# Patient Record
Sex: FEMALE | Race: BLACK OR AFRICAN AMERICAN | NOT HISPANIC OR LATINO | Employment: OTHER | URBAN - METROPOLITAN AREA
[De-identification: names, ages, dates, MRNs, and addresses within clinical notes are randomized per-mention and may not be internally consistent; named-entity substitution may affect disease eponyms.]

---

## 2017-02-14 ENCOUNTER — HOSPITAL ENCOUNTER (OUTPATIENT)
Dept: MAMMOGRAPHY | Facility: HOSPITAL | Age: 67
Discharge: HOME | End: 2017-02-14
Attending: FAMILY MEDICINE | Admitting: FAMILY MEDICINE

## 2017-02-24 ENCOUNTER — HOSPITAL ENCOUNTER (OUTPATIENT)
Dept: PAIN MEDICINE | Facility: HOSPITAL | Age: 67
Discharge: HOME | End: 2017-02-24
Attending: ANESTHESIOLOGY | Admitting: ANESTHESIOLOGY

## 2017-07-26 ENCOUNTER — HOSPITAL ENCOUNTER (OUTPATIENT)
Dept: PAIN MEDICINE | Facility: HOSPITAL | Age: 67
Discharge: HOME | End: 2017-07-26
Attending: ANESTHESIOLOGY | Admitting: ANESTHESIOLOGY

## 2018-02-20 ENCOUNTER — HOSPITAL ENCOUNTER (OUTPATIENT)
Dept: OTHER | Facility: HOSPITAL | Age: 68
Setting detail: SPECIMEN
Discharge: HOME | End: 2018-02-20
Attending: INTERNAL MEDICINE | Admitting: INTERNAL MEDICINE

## 2018-08-22 ENCOUNTER — HOSPITAL ENCOUNTER (OUTPATIENT)
Dept: PAIN MEDICINE | Facility: HOSPITAL | Age: 68
Discharge: HOME | End: 2018-08-22
Attending: ANESTHESIOLOGY | Admitting: ANESTHESIOLOGY

## 2018-10-24 ENCOUNTER — HOSPITAL ENCOUNTER (OUTPATIENT)
Dept: PAIN MEDICINE | Facility: HOSPITAL | Age: 68
Discharge: HOME | End: 2018-10-24
Attending: ANESTHESIOLOGY | Admitting: ANESTHESIOLOGY

## 2018-11-21 ENCOUNTER — HOSPITAL ENCOUNTER (OUTPATIENT)
Dept: PAIN MEDICINE | Facility: HOSPITAL | Age: 68
Discharge: HOME | End: 2018-11-21
Attending: ANESTHESIOLOGY | Admitting: ANESTHESIOLOGY

## 2018-12-28 ENCOUNTER — HOSPITAL ENCOUNTER (OUTPATIENT)
Dept: PAIN MEDICINE | Facility: HOSPITAL | Age: 68
Discharge: HOME | End: 2018-12-28
Attending: ANESTHESIOLOGY | Admitting: ANESTHESIOLOGY

## 2019-06-19 ENCOUNTER — TELEPHONE (OUTPATIENT)
Dept: NEUROLOGY | Facility: CLINIC | Age: 69
End: 2019-06-19

## 2019-06-25 ENCOUNTER — TELEPHONE (OUTPATIENT)
Dept: NEUROLOGY | Facility: CLINIC | Age: 69
End: 2019-06-25

## 2019-06-25 RX ORDER — LEVETIRACETAM 500 MG/1
500 TABLET ORAL 2 TIMES DAILY
Qty: 60 TABLET | Refills: 12 | Status: SHIPPED | OUTPATIENT
Start: 2019-06-25 | End: 2019-06-25 | Stop reason: SDUPTHER

## 2019-06-25 RX ORDER — TRAZODONE HYDROCHLORIDE 50 MG/1
TABLET ORAL
Refills: 1 | COMMUNITY
Start: 2019-05-01 | End: 2019-07-22 | Stop reason: SDUPTHER

## 2019-06-25 RX ORDER — LEVETIRACETAM 500 MG/1
TABLET ORAL
Qty: 180 TABLET | Refills: 3 | Status: SHIPPED | OUTPATIENT
Start: 2019-06-25 | End: 2019-11-04 | Stop reason: SDUPTHER

## 2019-07-22 RX ORDER — TRAZODONE HYDROCHLORIDE 50 MG/1
TABLET ORAL
Qty: 90 TABLET | Refills: 0 | Status: SHIPPED | OUTPATIENT
Start: 2019-07-22 | End: 2019-10-15 | Stop reason: SDUPTHER

## 2019-09-03 RX ORDER — GABAPENTIN 400 MG/1
CAPSULE ORAL
Qty: 270 CAPSULE | Refills: 1 | Status: SHIPPED | OUTPATIENT
Start: 2019-09-03 | End: 2019-11-04 | Stop reason: SDUPTHER

## 2019-10-15 RX ORDER — TRAZODONE HYDROCHLORIDE 50 MG/1
TABLET ORAL
Qty: 90 TABLET | Refills: 0 | Status: SHIPPED | OUTPATIENT
Start: 2019-10-15 | End: 2020-01-06

## 2019-10-17 ENCOUNTER — APPOINTMENT (OUTPATIENT)
Dept: PAIN MEDICINE | Facility: CLINIC | Age: 69
End: 2019-10-17

## 2019-11-04 ENCOUNTER — OFFICE VISIT (OUTPATIENT)
Dept: NEUROLOGY | Facility: CLINIC | Age: 69
End: 2019-11-04

## 2019-11-04 VITALS
HEART RATE: 64 BPM | HEIGHT: 60 IN | DIASTOLIC BLOOD PRESSURE: 89 MMHG | SYSTOLIC BLOOD PRESSURE: 174 MMHG | BODY MASS INDEX: 50.22 KG/M2 | WEIGHT: 255.8 LBS

## 2019-11-04 DIAGNOSIS — G47.33 OBSTRUCTIVE SLEEP APNEA SYNDROME: Primary | ICD-10-CM

## 2019-11-04 PROBLEM — R56.9 SEIZURES: Status: ACTIVE | Noted: 2019-11-04

## 2019-11-04 PROBLEM — G43.909 MIGRAINE: Status: ACTIVE | Noted: 2019-11-04

## 2019-11-04 PROBLEM — G47.30 SLEEP APNEA: Status: ACTIVE | Noted: 2019-11-04

## 2019-11-04 PROCEDURE — 99214 OFFICE O/P EST MOD 30 MIN: CPT | Performed by: PSYCHIATRY & NEUROLOGY

## 2019-11-04 RX ORDER — POTASSIUM CHLORIDE 20 MEQ/1
20 TABLET, EXTENDED RELEASE ORAL 3 TIMES DAILY
COMMUNITY
Start: 2019-09-19

## 2019-11-04 RX ORDER — MONTELUKAST SODIUM 10 MG/1
10 TABLET ORAL NIGHTLY
Refills: 3 | COMMUNITY
Start: 2019-09-04

## 2019-11-04 RX ORDER — OMEPRAZOLE 40 MG/1
CAPSULE, DELAYED RELEASE ORAL
Refills: 4 | COMMUNITY
Start: 2019-10-18

## 2019-11-04 RX ORDER — GABAPENTIN 400 MG/1
400 CAPSULE ORAL 3 TIMES DAILY
Qty: 270 CAPSULE | Refills: 1 | Status: SHIPPED | OUTPATIENT
Start: 2019-11-04 | End: 2020-07-13

## 2019-11-04 RX ORDER — LEVETIRACETAM 500 MG/1
500 TABLET ORAL 2 TIMES DAILY
Qty: 180 TABLET | Refills: 3 | Status: SHIPPED | OUTPATIENT
Start: 2019-11-04 | End: 2020-05-26 | Stop reason: SDUPTHER

## 2019-11-04 RX ORDER — PAROXETINE HYDROCHLORIDE 20 MG/1
20 TABLET, FILM COATED ORAL EVERY MORNING
COMMUNITY
Start: 2019-09-24

## 2019-11-04 NOTE — PROGRESS NOTES
Sleep medicine follow-up visit    Stefania Marie   1950  69 y.o. female   DATE OF SERVICE: 11/4/2019   Chief complaint, kenzie on  with cpap  Pt. f/u with cpap compliance, pt. doing well with pap therapy and uses nasal pillows and goes through [facility] for supplies.     MIGRAINE W/O AURA, unchanged doing well with Topamax. Improved patient one every now and then not as frequent.     Morbid obesity, unchanged, BMI 50    SLEEP TESTING HISTORY:    On NPSG at [facility] , 03/18/2014 patient had Severe obstructive sleep apnea syndrome with apnea-hypopnea index of 41.9 per sleep hour, minimum SpO2 of 79%    The compliance data reviewed and the patient is on CPAP therapy at 10-15 cm/H2O and compliance data indicates excellent compliance with 67% usage for more than 4 hours with an average usage of 6 hours 8 minutes. AHI down to 3.3 with CPAP therapy and mean CPAP pressure 12.1 cm of water.  The patient's hypersomnia also resolved with [place] Sleepiness Scale score of 0 with CPAP therapy.  The patient feels great and is benefiting from it and is compliant.     Complex partial seizures, doing well Topamax, gabapentin and keppra. Patient still has small spells comes on randomly only has had one couple of months ago.  Had one spell took 15 min to get back to baseline.     Review of Systems   Constitutional: Positive for fatigue. Negative for appetite change.   HENT: Negative for sinus pressure and sinus pain.    Eyes: Negative for pain and itching.   Respiratory: Negative for cough and shortness of breath.    Cardiovascular: Negative for chest pain and palpitations.   Gastrointestinal: Negative for constipation and diarrhea.   Endocrine: Negative for cold intolerance and heat intolerance.   Genitourinary: Positive for frequency. Negative for difficulty urinating.   Musculoskeletal: Positive for back pain. Negative for neck pain.   Allergic/Immunologic: Negative for environmental allergies.   Neurological: Positive for dizziness  and headaches. Negative for tremors, seizures, syncope, facial asymmetry, speech difficulty, weakness, light-headedness and numbness.   Psychiatric/Behavioral: Negative for agitation and confusion.     I reviewed and addressed ROS entered by MA.        The following portions of the patient's history were reviewed and updated as appropriate: allergies, current medications, past family history, past medical history, past social history, past surgical history and problem list.      History reviewed. No pertinent family history.    Past Medical History:   Diagnosis Date   • Anxiety    • Environmental and seasonal allergies    • GERD (gastroesophageal reflux disease)    • Migraine    • Seizures (CMS/HCC)    • Sleep apnea        Social History     Socioeconomic History   • Marital status:      Spouse name: Not on file   • Number of children: Not on file   • Years of education: Not on file   • Highest education level: Not on file   Tobacco Use   • Smoking status: Current Every Day Smoker   • Smokeless tobacco: Never Used   Substance and Sexual Activity   • Alcohol use: No     Frequency: Never   • Drug use: No   • Sexual activity: Defer         Current Outpatient Medications:   •  aspirin (ADULT ASPIRIN EC LOW STRENGTH) 81 MG EC tablet, ADULT ASPIRIN EC LOW STRENGTH 81 MG ORAL TABLET DELAYED RELEASE, Disp: , Rfl:   •  Calcium Carb-Cholecalciferol 1000-800 MG-UNIT tablet, CALTRATE 600+D3 TABS, Disp: , Rfl:   •  gabapentin (NEURONTIN) 400 MG capsule, TAKE 1 CAPSULE BY MOUTH THREE TIMES DAILY, Disp: 270 capsule, Rfl: 1  •  levETIRAcetam (KEPPRA) 500 MG tablet, TAKE 1 TABLET BY MOUTH TWICE DAILY, Disp: 180 tablet, Rfl: 3  •  montelukast (SINGULAIR) 10 MG tablet, , Disp: , Rfl: 3  •  omeprazole (priLOSEC) 40 MG capsule, TK ONE C PO  ONCE D, Disp: , Rfl: 4  •  PARoxetine (PAXIL) 20 MG tablet, , Disp: , Rfl:   •  potassium chloride (K-DUR,KLOR-CON) 20 MEQ CR tablet, , Disp: , Rfl:   •  topiramate (TOPAMAX) 200 MG tablet,  TAKE 1 TABLET BY MOUTH TWICE DAILY, Disp: 180 tablet, Rfl: 0  •  traZODone (DESYREL) 50 MG tablet, TAKE 1 TABLET BY MOUTH DAILY AT BEDTIME, Disp: 90 tablet, Rfl: 0    No Known Allergies     PHYSICAL EXAMINATION:  Vitals:    11/04/19 1651   BP: 174/89   Pulse: 64      Body mass index is 49.96 kg/m².       HEENT: Normal.      EXTREMITIES: No edema.     IMPRESSION:     Patient with obstructive sleep apnea syndrome with hypersomnia successfully treated with CPAP therapy and is compliant and benefiting from it.     Migraine, stable no ha in several months.    Seizures, pt continues to have occ PCS, on review of her med list apparently she has not been taking keppra, so this was reordered    Obesity     RECOMMENDATIONS:   1. Continue cpap 10-15   2. Pt encouraged to lose weight  3. Continue keppra, topamax and gabapentin    EPWORTH SLEEPINESS SCALE  Sitting and reading  0  WatchingTV  0  Sitting, inactive, in a public place  0  As a passenger in a car for 1 hour w/o a break  0  Lying down to rest in the afternoon  0  Sitting and talking to someone  0  Sitting quietly after a lunch  0  In a car, while stopped for traffic or a light  0  Total 0        This document has been electronically signed by Joseph Seipel, MD on November 4, 2019 5:14 PM

## 2019-11-13 ENCOUNTER — TELEPHONE (OUTPATIENT)
Dept: NEUROLOGY | Facility: CLINIC | Age: 69
End: 2019-11-13

## 2019-11-13 DIAGNOSIS — G47.33 OBSTRUCTIVE SLEEP APNEA: Primary | ICD-10-CM

## 2020-01-06 RX ORDER — TRAZODONE HYDROCHLORIDE 50 MG/1
TABLET ORAL
Qty: 90 TABLET | Refills: 1 | Status: SHIPPED | OUTPATIENT
Start: 2020-01-06 | End: 2020-04-13

## 2020-02-03 ENCOUNTER — APPOINTMENT (OUTPATIENT)
Dept: CT IMAGING | Facility: HOSPITAL | Age: 70
End: 2020-02-03

## 2020-02-03 ENCOUNTER — APPOINTMENT (OUTPATIENT)
Dept: GENERAL RADIOLOGY | Facility: HOSPITAL | Age: 70
End: 2020-02-03

## 2020-02-03 ENCOUNTER — HOSPITAL ENCOUNTER (OUTPATIENT)
Facility: HOSPITAL | Age: 70
Setting detail: OBSERVATION
Discharge: HOME | End: 2020-02-05
Attending: FAMILY MEDICINE | Admitting: FAMILY MEDICINE

## 2020-02-03 DIAGNOSIS — R77.8 ELEVATED TROPONIN: ICD-10-CM

## 2020-02-03 DIAGNOSIS — R07.9 CHEST PAIN, UNSPECIFIED TYPE: Primary | ICD-10-CM

## 2020-02-03 DIAGNOSIS — R06.00 DYSPNEA, UNSPECIFIED TYPE: ICD-10-CM

## 2020-02-03 PROBLEM — R06.02 SHORTNESS OF BREATH: Status: ACTIVE | Noted: 2020-02-03

## 2020-02-03 PROBLEM — G40.209 COMPLEX PARTIAL SEIZURE (HCC): Chronic | Status: ACTIVE | Noted: 2019-11-04

## 2020-02-03 PROBLEM — M19.91 PRIMARY OSTEOARTHRITIS: Chronic | Status: ACTIVE | Noted: 2020-02-03

## 2020-02-03 PROBLEM — K21.9 GERD (GASTROESOPHAGEAL REFLUX DISEASE): Chronic | Status: ACTIVE | Noted: 2020-02-03

## 2020-02-03 PROBLEM — F17.200 TOBACCO DEPENDENCY: Chronic | Status: ACTIVE | Noted: 2020-02-03

## 2020-02-03 PROBLEM — R79.89 ELEVATED TROPONIN: Status: ACTIVE | Noted: 2020-02-03

## 2020-02-03 PROBLEM — D64.9 NORMOCYTIC ANEMIA: Status: ACTIVE | Noted: 2020-02-03

## 2020-02-03 PROBLEM — G47.30 SLEEP APNEA: Chronic | Status: ACTIVE | Noted: 2019-11-04

## 2020-02-03 PROBLEM — M19.91 PRIMARY OSTEOARTHRITIS: Status: ACTIVE | Noted: 2020-02-03

## 2020-02-03 PROBLEM — G43.909 MIGRAINE: Chronic | Status: ACTIVE | Noted: 2019-11-04

## 2020-02-03 PROBLEM — R68.84 PAIN IN LOWER JAW: Status: ACTIVE | Noted: 2020-02-03

## 2020-02-03 LAB
ANION GAP SERPL CALCULATED.3IONS-SCNC: 14 MMOL/L (ref 5–15)
APTT PPP: 23.3 SECONDS (ref 24–31)
BASOPHILS # BLD AUTO: 0 10*3/MM3 (ref 0–0.2)
BASOPHILS NFR BLD AUTO: 0.5 % (ref 0–1.5)
BUN BLD-MCNC: 22 MG/DL (ref 8–23)
BUN/CREAT SERPL: 27.2 (ref 7–25)
CALCIUM SPEC-SCNC: 10.1 MG/DL (ref 8.6–10.5)
CHLORIDE SERPL-SCNC: 107 MMOL/L (ref 98–107)
CO2 SERPL-SCNC: 22 MMOL/L (ref 22–29)
CREAT BLD-MCNC: 0.81 MG/DL (ref 0.57–1)
CRP SERPL-MCNC: 0.67 MG/DL (ref 0–0.5)
D DIMER PPP FEU-MCNC: 1.57 MCGFEU/ML (ref 0.17–0.59)
DEPRECATED RDW RBC AUTO: 39.8 FL (ref 37–54)
EOSINOPHIL # BLD AUTO: 0.1 10*3/MM3 (ref 0–0.4)
EOSINOPHIL NFR BLD AUTO: 1.1 % (ref 0.3–6.2)
ERYTHROCYTE [DISTWIDTH] IN BLOOD BY AUTOMATED COUNT: 12.5 % (ref 12.3–15.4)
ERYTHROCYTE [SEDIMENTATION RATE] IN BLOOD: 60 MM/HR (ref 0–30)
FLUAV SUBTYP SPEC NAA+PROBE: NOT DETECTED
FLUBV RNA ISLT QL NAA+PROBE: NOT DETECTED
GFR SERPL CREATININE-BSD FRML MDRD: 85 ML/MIN/1.73
GLUCOSE BLD-MCNC: 101 MG/DL (ref 65–99)
HCT VFR BLD AUTO: 31.4 % (ref 34–46.6)
HGB BLD-MCNC: 10.5 G/DL (ref 12–15.9)
INR PPP: 1.17 (ref 0.9–1.1)
LYMPHOCYTES # BLD AUTO: 0.9 10*3/MM3 (ref 0.7–3.1)
LYMPHOCYTES NFR BLD AUTO: 19.1 % (ref 19.6–45.3)
MCH RBC QN AUTO: 29.9 PG (ref 26.6–33)
MCHC RBC AUTO-ENTMCNC: 33.4 G/DL (ref 31.5–35.7)
MCV RBC AUTO: 89.3 FL (ref 79–97)
MONOCYTES # BLD AUTO: 0.4 10*3/MM3 (ref 0.1–0.9)
MONOCYTES NFR BLD AUTO: 8.5 % (ref 5–12)
NEUTROPHILS # BLD AUTO: 3.3 10*3/MM3 (ref 1.7–7)
NEUTROPHILS NFR BLD AUTO: 70.8 % (ref 42.7–76)
NRBC BLD AUTO-RTO: 0 /100 WBC (ref 0–0.2)
NT-PROBNP SERPL-MCNC: 1896 PG/ML (ref 5–900)
PLATELET # BLD AUTO: 263 10*3/MM3 (ref 140–450)
PMV BLD AUTO: 7.2 FL (ref 6–12)
POTASSIUM BLD-SCNC: 4.6 MMOL/L (ref 3.5–5.2)
PROTHROMBIN TIME: 11.9 SECONDS (ref 9.6–11.7)
RBC # BLD AUTO: 3.52 10*6/MM3 (ref 3.77–5.28)
SODIUM BLD-SCNC: 143 MMOL/L (ref 136–145)
T4 FREE SERPL-MCNC: 4.42 NG/DL (ref 0.93–1.7)
TROPONIN T SERPL-MCNC: 0.1 NG/ML (ref 0–0.03)
TROPONIN T SERPL-MCNC: 0.12 NG/ML (ref 0–0.03)
TSH SERPL DL<=0.05 MIU/L-ACNC: <0.005 UIU/ML (ref 0.27–4.2)
WBC NRBC COR # BLD: 4.6 10*3/MM3 (ref 3.4–10.8)

## 2020-02-03 PROCEDURE — G0378 HOSPITAL OBSERVATION PER HR: HCPCS

## 2020-02-03 PROCEDURE — 84484 ASSAY OF TROPONIN QUANT: CPT | Performed by: NURSE PRACTITIONER

## 2020-02-03 PROCEDURE — 96365 THER/PROPH/DIAG IV INF INIT: CPT

## 2020-02-03 PROCEDURE — 80048 BASIC METABOLIC PNL TOTAL CA: CPT | Performed by: NURSE PRACTITIONER

## 2020-02-03 PROCEDURE — 93005 ELECTROCARDIOGRAM TRACING: CPT

## 2020-02-03 PROCEDURE — 87502 INFLUENZA DNA AMP PROBE: CPT | Performed by: NURSE PRACTITIONER

## 2020-02-03 PROCEDURE — 96366 THER/PROPH/DIAG IV INF ADDON: CPT

## 2020-02-03 PROCEDURE — 84443 ASSAY THYROID STIM HORMONE: CPT | Performed by: NURSE PRACTITIONER

## 2020-02-03 PROCEDURE — 71275 CT ANGIOGRAPHY CHEST: CPT

## 2020-02-03 PROCEDURE — 25010000002 ENOXAPARIN PER 10 MG: Performed by: NURSE PRACTITIONER

## 2020-02-03 PROCEDURE — 99220 PR INITIAL OBSERVATION CARE/DAY 70 MINUTES: CPT | Performed by: FAMILY MEDICINE

## 2020-02-03 PROCEDURE — 85610 PROTHROMBIN TIME: CPT | Performed by: NURSE PRACTITIONER

## 2020-02-03 PROCEDURE — 85652 RBC SED RATE AUTOMATED: CPT | Performed by: NURSE PRACTITIONER

## 2020-02-03 PROCEDURE — 83880 ASSAY OF NATRIURETIC PEPTIDE: CPT | Performed by: FAMILY MEDICINE

## 2020-02-03 PROCEDURE — 96372 THER/PROPH/DIAG INJ SC/IM: CPT

## 2020-02-03 PROCEDURE — 99284 EMERGENCY DEPT VISIT MOD MDM: CPT

## 2020-02-03 PROCEDURE — 87040 BLOOD CULTURE FOR BACTERIA: CPT | Performed by: NURSE PRACTITIONER

## 2020-02-03 PROCEDURE — 86140 C-REACTIVE PROTEIN: CPT | Performed by: NURSE PRACTITIONER

## 2020-02-03 PROCEDURE — 85379 FIBRIN DEGRADATION QUANT: CPT | Performed by: NURSE PRACTITIONER

## 2020-02-03 PROCEDURE — 85730 THROMBOPLASTIN TIME PARTIAL: CPT | Performed by: NURSE PRACTITIONER

## 2020-02-03 PROCEDURE — 93005 ELECTROCARDIOGRAM TRACING: CPT | Performed by: FAMILY MEDICINE

## 2020-02-03 PROCEDURE — 85025 COMPLETE CBC W/AUTO DIFF WBC: CPT | Performed by: NURSE PRACTITIONER

## 2020-02-03 PROCEDURE — 0 IOPAMIDOL PER 1 ML: Performed by: NURSE PRACTITIONER

## 2020-02-03 PROCEDURE — 84439 ASSAY OF FREE THYROXINE: CPT | Performed by: FAMILY MEDICINE

## 2020-02-03 PROCEDURE — 71046 X-RAY EXAM CHEST 2 VIEWS: CPT

## 2020-02-03 RX ORDER — NITROGLYCERIN 20 MG/100ML
INJECTION INTRAVENOUS
Status: COMPLETED
Start: 2020-02-03 | End: 2020-02-03

## 2020-02-03 RX ORDER — POTASSIUM CHLORIDE 20 MEQ/1
40 TABLET, EXTENDED RELEASE ORAL AS NEEDED
Status: DISCONTINUED | OUTPATIENT
Start: 2020-02-03 | End: 2020-02-05 | Stop reason: HOSPADM

## 2020-02-03 RX ORDER — SODIUM CHLORIDE 0.9 % (FLUSH) 0.9 %
10 SYRINGE (ML) INJECTION EVERY 12 HOURS SCHEDULED
Status: DISCONTINUED | OUTPATIENT
Start: 2020-02-03 | End: 2020-02-05 | Stop reason: HOSPADM

## 2020-02-03 RX ORDER — ACETAMINOPHEN 650 MG/1
650 SUPPOSITORY RECTAL EVERY 4 HOURS PRN
Status: DISCONTINUED | OUTPATIENT
Start: 2020-02-03 | End: 2020-02-05 | Stop reason: HOSPADM

## 2020-02-03 RX ORDER — MONTELUKAST SODIUM 10 MG/1
10 TABLET ORAL NIGHTLY
Status: DISCONTINUED | OUTPATIENT
Start: 2020-02-03 | End: 2020-02-05 | Stop reason: HOSPADM

## 2020-02-03 RX ORDER — NITROGLYCERIN 20 MG/100ML
5-200 INJECTION INTRAVENOUS
Status: DISCONTINUED | OUTPATIENT
Start: 2020-02-03 | End: 2020-02-05 | Stop reason: HOSPADM

## 2020-02-03 RX ORDER — GABAPENTIN 400 MG/1
400 CAPSULE ORAL 3 TIMES DAILY
Status: DISCONTINUED | OUTPATIENT
Start: 2020-02-03 | End: 2020-02-05 | Stop reason: HOSPADM

## 2020-02-03 RX ORDER — CHOLECALCIFEROL (VITAMIN D3) 125 MCG
5 CAPSULE ORAL NIGHTLY PRN
Status: DISCONTINUED | OUTPATIENT
Start: 2020-02-03 | End: 2020-02-05 | Stop reason: HOSPADM

## 2020-02-03 RX ORDER — ALUMINA, MAGNESIA, AND SIMETHICONE 2400; 2400; 240 MG/30ML; MG/30ML; MG/30ML
15 SUSPENSION ORAL EVERY 6 HOURS PRN
Status: DISCONTINUED | OUTPATIENT
Start: 2020-02-03 | End: 2020-02-05 | Stop reason: HOSPADM

## 2020-02-03 RX ORDER — ORLISTAT 120 MG/1
120 CAPSULE ORAL DAILY
COMMUNITY
End: 2020-03-09

## 2020-02-03 RX ORDER — PAROXETINE HYDROCHLORIDE 20 MG/1
20 TABLET, FILM COATED ORAL EVERY MORNING
Status: DISCONTINUED | OUTPATIENT
Start: 2020-02-04 | End: 2020-02-05 | Stop reason: HOSPADM

## 2020-02-03 RX ORDER — PANTOPRAZOLE SODIUM 40 MG/1
40 TABLET, DELAYED RELEASE ORAL EVERY MORNING
Status: DISCONTINUED | OUTPATIENT
Start: 2020-02-04 | End: 2020-02-05 | Stop reason: HOSPADM

## 2020-02-03 RX ORDER — MAGNESIUM SULFATE HEPTAHYDRATE 40 MG/ML
2 INJECTION, SOLUTION INTRAVENOUS AS NEEDED
Status: DISCONTINUED | OUTPATIENT
Start: 2020-02-03 | End: 2020-02-05 | Stop reason: HOSPADM

## 2020-02-03 RX ORDER — SODIUM CHLORIDE 0.9 % (FLUSH) 0.9 %
10 SYRINGE (ML) INJECTION AS NEEDED
Status: DISCONTINUED | OUTPATIENT
Start: 2020-02-03 | End: 2020-02-05 | Stop reason: HOSPADM

## 2020-02-03 RX ORDER — ONDANSETRON 2 MG/ML
4 INJECTION INTRAMUSCULAR; INTRAVENOUS EVERY 6 HOURS PRN
Status: DISCONTINUED | OUTPATIENT
Start: 2020-02-03 | End: 2020-02-05 | Stop reason: HOSPADM

## 2020-02-03 RX ORDER — POTASSIUM CHLORIDE 20 MEQ/1
20 TABLET, EXTENDED RELEASE ORAL 3 TIMES DAILY
Status: DISCONTINUED | OUTPATIENT
Start: 2020-02-04 | End: 2020-02-05 | Stop reason: HOSPADM

## 2020-02-03 RX ORDER — ACETAMINOPHEN 325 MG/1
650 TABLET ORAL EVERY 4 HOURS PRN
Status: DISCONTINUED | OUTPATIENT
Start: 2020-02-03 | End: 2020-02-05 | Stop reason: HOSPADM

## 2020-02-03 RX ORDER — ASPIRIN 81 MG/1
324 TABLET, CHEWABLE ORAL ONCE
Status: COMPLETED | OUTPATIENT
Start: 2020-02-03 | End: 2020-02-03

## 2020-02-03 RX ORDER — MAGNESIUM SULFATE 1 G/100ML
1 INJECTION INTRAVENOUS AS NEEDED
Status: DISCONTINUED | OUTPATIENT
Start: 2020-02-03 | End: 2020-02-05 | Stop reason: HOSPADM

## 2020-02-03 RX ORDER — NITROGLYCERIN 0.4 MG/1
0.4 TABLET SUBLINGUAL
Status: DISCONTINUED | OUTPATIENT
Start: 2020-02-03 | End: 2020-02-05 | Stop reason: HOSPADM

## 2020-02-03 RX ORDER — ACETAMINOPHEN 160 MG/5ML
650 SOLUTION ORAL EVERY 4 HOURS PRN
Status: DISCONTINUED | OUTPATIENT
Start: 2020-02-03 | End: 2020-02-05 | Stop reason: HOSPADM

## 2020-02-03 RX ORDER — TOPIRAMATE 100 MG/1
200 TABLET, FILM COATED ORAL 2 TIMES DAILY
Status: DISCONTINUED | OUTPATIENT
Start: 2020-02-03 | End: 2020-02-05 | Stop reason: HOSPADM

## 2020-02-03 RX ORDER — BISACODYL 10 MG
10 SUPPOSITORY, RECTAL RECTAL DAILY PRN
Status: DISCONTINUED | OUTPATIENT
Start: 2020-02-03 | End: 2020-02-05 | Stop reason: HOSPADM

## 2020-02-03 RX ORDER — LEVETIRACETAM 500 MG/1
500 TABLET ORAL 2 TIMES DAILY
Status: DISCONTINUED | OUTPATIENT
Start: 2020-02-03 | End: 2020-02-05 | Stop reason: HOSPADM

## 2020-02-03 RX ORDER — BUSPIRONE HYDROCHLORIDE 10 MG/1
10 TABLET ORAL 4 TIMES DAILY
Status: DISCONTINUED | OUTPATIENT
Start: 2020-02-03 | End: 2020-02-05 | Stop reason: HOSPADM

## 2020-02-03 RX ORDER — ASPIRIN 81 MG/1
81 TABLET ORAL DAILY
Status: DISCONTINUED | OUTPATIENT
Start: 2020-02-04 | End: 2020-02-05 | Stop reason: HOSPADM

## 2020-02-03 RX ORDER — BUSPIRONE HYDROCHLORIDE 10 MG/1
10 TABLET ORAL 4 TIMES DAILY
COMMUNITY

## 2020-02-03 RX ORDER — FOLIC ACID/MULTIVIT,IRON,MINER .4-18-35
1 TABLET,CHEWABLE ORAL DAILY
Status: DISCONTINUED | OUTPATIENT
Start: 2020-02-04 | End: 2020-02-05 | Stop reason: HOSPADM

## 2020-02-03 RX ORDER — TRAZODONE HYDROCHLORIDE 50 MG/1
50 TABLET ORAL NIGHTLY
Status: DISCONTINUED | OUTPATIENT
Start: 2020-02-03 | End: 2020-02-05 | Stop reason: HOSPADM

## 2020-02-03 RX ORDER — ONDANSETRON 4 MG/1
4 TABLET, FILM COATED ORAL EVERY 6 HOURS PRN
Status: DISCONTINUED | OUTPATIENT
Start: 2020-02-03 | End: 2020-02-05 | Stop reason: HOSPADM

## 2020-02-03 RX ADMIN — NITROGLYCERIN 50000 MCG: 20 INJECTION INTRAVENOUS at 11:51

## 2020-02-03 RX ADMIN — Medication 10 ML: at 21:20

## 2020-02-03 RX ADMIN — SODIUM CHLORIDE 500 ML: 900 INJECTION, SOLUTION INTRAVENOUS at 11:53

## 2020-02-03 RX ADMIN — LEVETIRACETAM 500 MG: 500 TABLET ORAL at 21:20

## 2020-02-03 RX ADMIN — IOPAMIDOL 100 ML: 755 INJECTION, SOLUTION INTRAVENOUS at 12:43

## 2020-02-03 RX ADMIN — TOPIRAMATE 200 MG: 100 TABLET, FILM COATED ORAL at 21:20

## 2020-02-03 RX ADMIN — TRAZODONE HYDROCHLORIDE 50 MG: 50 TABLET ORAL at 21:20

## 2020-02-03 RX ADMIN — BUSPIRONE HYDROCHLORIDE 10 MG: 10 TABLET ORAL at 21:20

## 2020-02-03 RX ADMIN — ENOXAPARIN SODIUM 40 MG: 40 INJECTION SUBCUTANEOUS at 21:20

## 2020-02-03 RX ADMIN — GABAPENTIN 400 MG: 400 CAPSULE ORAL at 21:20

## 2020-02-03 RX ADMIN — ASPIRIN 81 MG 324 MG: 81 TABLET ORAL at 11:51

## 2020-02-03 RX ADMIN — MONTELUKAST SODIUM 10 MG: 10 TABLET, FILM COATED ORAL at 21:20

## 2020-02-03 RX ADMIN — LIDOCAINE HYDROCHLORIDE: 20 SOLUTION ORAL; TOPICAL at 23:23

## 2020-02-03 NOTE — H&P
[facility]      Patient Name: Stefania Marie  : 1950  MRN: [id number]  Primary Care Physician: Ugo Gil MD  Date of admission: 2/3/2020    Patient Care Team:  Ugo Gil MD as PCP - General          Subjective   History Present Illness     Chief Complaint:   Chief Complaint   Patient presents with   • Shortness of Breath       Ms. Marie is a 69 y.o. morbidly obese  female with a history of sleep apnea, anxiety, complex partial seizure, migraines, and GERD who presents to [facility] ED on 2020 complaining of shortness of breath. The patient states her symptoms started approximately 3 weeks ago and have gotten progressively worse. She denies any chest pain. She states the shortness of breath is intermittent and worse with exertion. She denies any alleviating factors. She denies a history of coronary artery disease, HLD, or DMII. She reports a remote history of HTN, but no longer takes antihypertensives. She is a heavy smoker with a 60 pack year history. She reports a family history of heart disease. She denies any previous cardiac workup.     The patient is also complaining of left jaw pain. She reports a recent left lower molar tooth infection with foul odor and moderate to severe left jaw pain. She states she cannot afford to go to the dentist. She denies any recent antibiotic use, but states she has been swishing with Peroxide at home.     Upon arrival to the ER the patient was given aspirin 324 mg PO. Her troponin is elevated at 0.118. Her dimer is also elevated at 1.57. Her EKG shows sinus tachycardia with old infarct. Her CT chest was negative for PE. Her CXR showed no acute findings. Her labs are significant for hgb 10.5, CRP 0.67, and ESR 60. Blood cultures were obtained. She was started on Tridil drip. She was also given normal saline 500 cc. She will be admitted for observation and further evaluation.         Review of Systems   Constitution: Negative for chills and fever.   HENT:        Left lower jaw pain   Cardiovascular: Negative for chest pain.   Respiratory: Positive for shortness of breath. Negative for cough.    All other systems reviewed and are negative.      Personal History     Past Medical History:   Past Medical History:   Diagnosis Date   • Anxiety    • Environmental and seasonal allergies    • GERD (gastroesophageal reflux disease)    • Migraine    • Obesity    • Seizures (CMS/HCC)    • Sleep apnea    • Tobacco dependency        Surgical History:      Past Surgical History:   Procedure Laterality Date   • GALLBLADDER SURGERY     • TUBAL ABDOMINAL LIGATION         Family History: family history includes Cancer in her sister; Hypertension in her mother. Otherwise pertinent FHx was reviewed and unremarkable.     Social History:  reports that she quit smoking about 3 weeks ago. Her smoking use included cigarettes. She has a 60.00 pack-year smoking history. She has never used smokeless tobacco. She reports that she does not drink alcohol or use drugs.      Medications:  Prior to Admission medications    Medication Sig Start Date End Date Taking? Authorizing Provider   busPIRone (BUSPAR) 10 MG tablet Take 10 mg by mouth 4 (Four) Times a Day.   Yes Parvez Wilde MD   Multiple Vitamins-Minerals (CENTRUM ADULTS PO) Take 1 tablet by mouth Daily.   Yes Parvez Wilde MD   orlistat (XENICAL) 120 MG capsule Take 120 mg by mouth Daily.   Yes Parvez Wilde MD   aspirin (ADULT ASPIRIN EC LOW STRENGTH) 81 MG EC tablet  9/21/15   Parvez Wilde MD   Calcium Carb-Cholecalciferol 1000-800 MG-UNIT tablet CALTRATE 600+D3 TABS    Parvez Wilde MD   gabapentin (NEURONTIN) 400 MG capsule Take 1 capsule by mouth 3 (Three) Times a Day. 11/4/19   Seipel, Joseph F, MD   levETIRAcetam (KEPPRA) 500 MG tablet Take 1 tablet by mouth 2 (Two) Times a Day. 11/4/19   Seipel, Joseph F, MD   montelukast  (SINGULAIR) 10 MG tablet Take 10 mg by mouth Every Night. 9/4/19   Parvez Wilde MD   omeprazole (priLOSEC) 40 MG capsule TK ONE C PO  ONCE D 10/18/19   Parvez Wilde MD   PARoxetine (PAXIL) 20 MG tablet Take 20 mg by mouth Every Morning. 9/24/19   Parvez Wilde MD   potassium chloride (K-DUR,KLOR-CON) 20 MEQ CR tablet Take 20 mEq by mouth 3 (Three) Times a Day. 9/19/19   Parvez Wilde MD   topiramate (TOPAMAX) 200 MG tablet Take 1 tablet by mouth 2 (Two) Times a Day. 11/4/19   Seipel, Joseph F, MD   traZODone (DESYREL) 50 MG tablet TAKE 1 TABLET BY MOUTH DAILY AT BEDTIME 1/6/20   Seipel, Joseph F, MD       Allergies:    Allergies   Allergen Reactions   • Penicillins Itching         Objective   Objective     Vital Signs  Temp:  [98.3 °F (36.8 °C)] 98.3 °F (36.8 °C)  Heart Rate:  [] 99  Resp:  [16] 16  BP: (130-163)/(58-83) 130/58  SpO2:  [94 %-100 %] 100 %  on   ;      Body mass index is 44.84 kg/m².    Physical Exam   Constitutional: She is oriented to person, place, and time. She appears well-developed.   Morbidly obese   HENT:   Head: Normocephalic and atraumatic.   Mouth/Throat: Dental caries present.   Erythema of left lower gum without exudate   Eyes: Pupils are equal, round, and reactive to light. Conjunctivae and EOM are normal.   Neck: Normal range of motion. Neck supple.   Cardiovascular: Normal rate, regular rhythm, normal heart sounds and intact distal pulses.   Pulmonary/Chest: Effort normal and breath sounds normal.   Abdominal: Soft. Bowel sounds are normal.   Musculoskeletal: Normal range of motion. She exhibits no edema.   Neurological: She is alert and oriented to person, place, and time.   Skin: Skin is warm and dry. Capillary refill takes less than 2 seconds.   Psychiatric: She has a normal mood and affect.   Vitals reviewed.      Results Review:  I have personally reviewed most recent cardiac tracings, lab results and radiology images and interpretations and  agree with findings.    Results from last 7 days   Lab Units 02/03/20  1022   WBC 10*3/mm3 4.60   HEMOGLOBIN g/dL 10.5*   HEMATOCRIT % 31.4*   PLATELETS 10*3/mm3 263   INR  1.17*     Results from last 7 days   Lab Units 02/03/20  1022   SODIUM mmol/L 143   POTASSIUM mmol/L 4.6   CHLORIDE mmol/L 107   CO2 mmol/L 22.0   BUN mg/dL 22   CREATININE mg/dL 0.81   GLUCOSE mg/dL 101*   CALCIUM mg/dL 10.1   TROPONIN T ng/mL 0.118*     Estimated Creatinine Clearance: 78.2 mL/min (by C-G formula based on SCr of 0.81 mg/dL).  Brief Urine Lab Results     None          Microbiology Results (last 10 days)     Procedure Component Value - Date/Time    Influenza Antigen, Rapid - Swab, Nasopharynx [[id number]]  (Normal) Collected:  02/03/20 1022    Lab Status:  Final result Specimen:  Swab from Nasopharynx Updated:  02/03/20 1056     Influenza A PCR Not Detected     Influenza B PCR Not Detected          ECG/EMG Results (most recent)     Procedure Component Value Units Date/Time    ECG 12 Lead [[id number]] Collected:  02/03/20 0959     Updated:  02/03/20 1000    Narrative:       HEART RATE= 100  bpm  RR Interval= 600  ms  OR Interval= 150  ms  P Horizontal Axis= -10  deg  P Front Axis= 57  deg  QRSD Interval= 74  ms  QT Interval= 341  ms  QRS Axis= -16  deg  T Wave Axis= -17  deg  - ABNORMAL ECG -  Sinus tachycardia  Inferior infarct, old  Electronically Signed By:   Date and Time of Study: 2020-02-03 09:59:01            Xr Chest 2 View    Result Date: 2/3/2020   1. No acute chest findings. 2. Borderline cardiac enlargement.  Electronically Signed By-Dr. Deann Salinas MD On:2/3/2020 11:01 AM This report was finalized on [id number] by Dr. Deann Salinas MD.    Ct Chest Pulmonary Embolism    Result Date: 2/3/2020  No evidence of pulmonary embolus or thoracic aortic aneurysm or dissection No evidence of adenopathy or mass, no acute/ active cardiopulmonary disease is present. Changes of dish syndrome, upper abdomen is unremarkable with  change of cholecystectomy     Electronically Signed By-Gonzalo Grimaldo Jr. On:2/3/2020 12:49 PM This report was finalized on [id number] by  Gonzalo Grimaldo Jr., .        Estimated Creatinine Clearance: 78.2 mL/min (by C-G formula based on SCr of 0.81 mg/dL).    Assessment/Plan   Assessment/Plan       Active Hospital Problems    Diagnosis  POA   • **Shortness of breath [R06.02]  Yes     Priority: High   • Elevated troponin [R79.89]  Yes     Priority: High   • Pain in lower jaw [R68.84]  Yes     Priority: Medium   • Normocytic anemia [D64.9]  Yes     Priority: Low   • GERD (gastroesophageal reflux disease) [K21.9]  Yes   • Tobacco dependency [F17.200]  Yes   • Complex partial seizure (CMS/HCC) [G40.209]  Yes   • Sleep apnea [G47.30]  Yes   • Migraine [G43.909]  Yes   • Morbid obesity (CMS/HCC) [E66.01]  Yes   • Anxiety [F41.9]  Yes      Resolved Hospital Problems   No resolved problems to display.         Shortness of breath with Elevated troponin  -patient denies chest pain  -dimer elevated, but CT chest negative for PE  -initial troponin 0.118; check serial troponin  -given aspirin 324 mg PO in ER  -continue Tridil drip   -continuous cardiac monitoring  -obtain 2D echo  -consider stress Myoview vs cardiology consultation    Acute pain in left lower jaw  -patient reports recent left lower molar infection and states she cannot afford to go to the dentist----consult     -WBC WNL, afebrile, CRP and ESR slightly elevated  -no antibiotics indicated at this time  -blood cultures pending  -2D echo as above to r/o endocarditis  -dental ball x 1   -Tylenol PRN     Normocytic anemia  -etiology unclear  -hgb 10.5 on admission, compared to 12.1 on 09/05/2018  -monitor and trend    Sleep apnea  -compliant with home nocturnal CPAP, continue    Complex partial seizure   -continue home Keppra, gabapentin, and Topamax  -seizure precautions    Migraine  -chronic, stable  -continue home Topamax    Anxiety  -chronic,  stable  -continue home BuSpar, Paxil, and trazodone     Morbid obesity   -BMI 44.84  -hold orlistat for now  -encourage lifestyle modifications  -check A1c, lipid panel, and TSH    GERD (gastroesophageal reflux disease)  -continue PPI    Tobacco dependency, 60 pack-year history   -patient states she quit 3 weeks ago  -continued cessation encouraged      VTE Prophylaxis - Lovenox 40 mg sq q12h.      CODE STATUS:    Code Status and Medical Interventions:   Ordered at: 02/03/20 1440     Code Status:    CPR     Medical Interventions (Level of Support Prior to Arrest):    Full       Admission Status:  I believe this patient meets observation criteria.      I discussed the patient's findings and my recommendations with patient and family.        Electronically signed by SHASTA Glaser, 02/03/20, 2:46 PM.  [facility]ist Team

## 2020-02-03 NOTE — ED PROVIDER NOTES
Subjective   Chief complaint: Chest pain shortness of breath      Context: Patient is a 69-year-old female who comes in complaining of chest pain shortness of breath for the last week.  Has had some dyspnea with exertion.  She denies any nausea vomiting diarrhea or fever.  Has had some diaphoresis.  She states she has had a dental infection on her left lower jaw under her gold teeth for the last 3 months but has not been treated for this.  She denies any swelling in her legs or feet.  Denies any recent trauma surgery immobilization or prior history of DVT or PE.  Denies any melena hematochezia or abdominal pain.    Duration: 1 week    Timing: Waxes and wanes    Severity: Moderate    Associated symptoms: Worse with exertion          PCP: Nail      LNMP: Postmenopausal            Review of Systems   Constitutional: Positive for activity change and diaphoresis.   HENT: Positive for dental problem.    Eyes: Negative.    Respiratory: Positive for shortness of breath.    Cardiovascular: Positive for chest pain.   Gastrointestinal: Negative.    Endocrine: Negative.    Genitourinary: Negative.    Musculoskeletal: Negative.    Skin: Negative.    Neurological: Negative.    Hematological: Does not bruise/bleed easily.   Psychiatric/Behavioral: Negative.        Past Medical History:   Diagnosis Date   • Anxiety    • Environmental and seasonal allergies    • GERD (gastroesophageal reflux disease)    • Migraine    • Seizures (CMS/HCC)    • Sleep apnea        Allergies   Allergen Reactions   • Penicillins Itching       Past Surgical History:   Procedure Laterality Date   • GALLBLADDER SURGERY         No family history on file.    Social History     Socioeconomic History   • Marital status:      Spouse name: Not on file   • Number of children: Not on file   • Years of education: Not on file   • Highest education level: Not on file   Tobacco Use   • Smoking status: Current Every Day Smoker   • Smokeless tobacco: Never Used    Substance and Sexual Activity   • Alcohol use: No     Frequency: Never   • Drug use: No   • Sexual activity: Defer           Objective   Physical Exam     Vital signs and triage nurse note reviewed.   Constitutional: Awake, alert; well-developed and well-nourished. No acute distress is noted. Obese.  HEENT: Normocephalic, atraumatic; pupils are PERRL with intact EOM; oropharynx is pink and moist without exudate or erythema. There is no submental induration no facial swelling.  No gum swelling or palpable abscess  Neck: Supple, full range of motion without pain; no cervical lymphadenopathy.   Cardiovascular: Regular rate and rhythm, normal S1-S2.  No definite murmur rub   Pulmonary: Respiratory effort regular nonlabored, breath sounds clear to auscultation all fields.   Abdomen: Soft, nontender nondistended with normoactive bowel sounds; no rebound or guarding.   Musculoskeletal: Independent range of motion of all extremities with no palpable tenderness or edema.   Neuro: Alert oriented x3, speech is clear and appropriate, GCS 15   Skin:  Fleshtone warm, dry, intact; no erythematous or petechial rash or lesion       Procedures           ED Course      Labs Reviewed   BASIC METABOLIC PANEL - Abnormal; Notable for the following components:       Result Value    Glucose 101 (*)     BUN/Creatinine Ratio 27.2 (*)     All other components within normal limits    Narrative:     GFR Normal >60  Chronic Kidney Disease <60  Kidney Failure <15     PROTIME-INR - Abnormal; Notable for the following components:    Protime 11.9 (*)     INR 1.17 (*)     All other components within normal limits   APTT - Abnormal; Notable for the following components:    PTT 23.3 (*)     All other components within normal limits   TROPONIN (IN-HOUSE) - Abnormal; Notable for the following components:    Troponin T 0.118 (*)     All other components within normal limits    Narrative:     Troponin T Reference Range:  <= 0.03 ng/mL-   Negative for  AMI  >0.03 ng/mL-     Abnormal for myocardial necrosis.  Clinicians would have to utilize clinical acumen, EKG, Troponin and serial changes to determine if it is an Acute Myocardial Infarction or myocardial injury due to an underlying chronic condition.       Results may be falsely decreased if patient taking Biotin.     SEDIMENTATION RATE - Abnormal; Notable for the following components:    Sed Rate 60 (*)     All other components within normal limits   C-REACTIVE PROTEIN - Abnormal; Notable for the following components:    C-Reactive Protein 0.67 (*)     All other components within normal limits   D-DIMER, QUANTITATIVE - Abnormal; Notable for the following components:    D-Dimer, Quantitative 1.57 (*)     All other components within normal limits    Narrative:     Reference Range  --------------------------------------------------------------------     < 0.50   Negative Predictive Value  0.50-0.59   Indeterminate    >= 0.60   Probable VTE             A very low percentage of patients with DVT may yield D-Dimer results   below the cut-off of 0.50 MCGFEU/mL.  This is known to be more   prevalent in patients with distal DVT.             Results of this test should always be interpreted in conjunction with   the patient's medical history, clinical presentation and other   findings.  Clinical diagnosis should not be based on the result of   INNOVANCE D-Dimer alone.   CBC WITH AUTO DIFFERENTIAL - Abnormal; Notable for the following components:    RBC 3.52 (*)     Hemoglobin 10.5 (*)     Hematocrit 31.4 (*)     Lymphocyte % 19.1 (*)     All other components within normal limits   INFLUENZA ANTIGEN, RAPID - Normal   BLOOD CULTURE   BLOOD CULTURE   CBC AND DIFFERENTIAL    Narrative:     The following orders were created for panel order CBC & Differential.  Procedure                               Abnormality         Status                     ---------                               -----------         ------                      CBC Auto Differential[[id number]]        Abnormal            Final result                 Please view results for these tests on the individual orders.     Medications   sodium chloride 0.9 % flush 10 mL (has no administration in time range)   nitroglycerin 50 mg/250 mL (0.2 mg/mL) infusion (50,000 mcg Intravenous New Bag 2/3/20 1151)   aspirin chewable tablet 324 mg (324 mg Oral Given 2/3/20 1151)   sodium chloride 0.9 % bolus 500 mL (500 mL Intravenous New Bag 2/3/20 1153)   iopamidol (ISOVUE-370) 76 % injection 100 mL (100 mL Intravenous Given 2/3/20 1243)     Xr Chest 2 View    Result Date: 2/3/2020   1. No acute chest findings. 2. Borderline cardiac enlargement.  Electronically Signed By-Dr. Deann Salinas MD On:2/3/2020 11:01 AM This report was finalized on [id number] by Dr. Deann Salinas MD.    Ct Chest Pulmonary Embolism    Result Date: 2/3/2020  No evidence of pulmonary embolus or thoracic aortic aneurysm or dissection No evidence of adenopathy or mass, no acute/ active cardiopulmonary disease is present. Changes of dish syndrome, upper abdomen is unremarkable with change of cholecystectomy     Electronically Signed By-Gonzalo Grimaldo Jr. On:2/3/2020 12:49 PM This report was finalized on [id number] by  Gonzalo Grimaldo Jr., .                                             MDM  Number of Diagnoses or Management Options  Chest pain, unspecified type:   Dyspnea, unspecified type:   Elevated troponin:   Diagnosis management comments: Medical decision  Comorbidities:  has a past medical history of Anxiety, Environmental and seasonal allergies, GERD (gastroesophageal reflux disease), Migraine, Seizures (CMS/HCC), and Sleep apnea.  Differentials: STEMI non-STEMI electrode abnormalities dehydration PE endocarditis  Md: dev santacruz hospitalist  Radiology interpretation:  X-rays reviewed by me and interpreted by radiologist,   Xr Chest 2 View    Result Date: 2/3/2020   1. No acute chest findings. 2. Borderline cardiac  enlargement.  Electronically Signed By-Dr. Deann Salinas MD On:2/3/2020 11:01 AM This report was finalized on [id number] by Dr. Deann Salinas MD.    Ct Chest Pulmonary Embolism    Result Date: 2/3/2020  No evidence of pulmonary embolus or thoracic aortic aneurysm or dissection No evidence of adenopathy or mass, no acute/ active cardiopulmonary disease is present. Changes of dish syndrome, upper abdomen is unremarkable with change of cholecystectomy     Electronically Signed By-Gonzalo Grimaldo Jr. On:2/3/2020 12:49 PM This report was finalized on [id number] by  Gonzalo Grimaldo Jr., .    Lab interpretation:  Labs viewed by me significant for, evaded CRP and ESR.  Troponin 0 0.118 elevated d-dimer    She was given aspirin and nitroglycerin.  On reexam states she is feeling better.    i discussed findings with patient who voices understanding of admission I discussed with the hospitalist who agreed to admit.  I discussed with Dr. do who also saw and evaluated patient while in the ER.        Patient Progress  Patient progress: stable      Final diagnoses:   Chest pain, unspecified type   Dyspnea, unspecified type   Elevated troponin            Alondra Deutsch, APRN  02/03/20 1401

## 2020-02-04 ENCOUNTER — APPOINTMENT (OUTPATIENT)
Dept: CARDIOLOGY | Facility: HOSPITAL | Age: 70
End: 2020-02-04

## 2020-02-04 LAB
ANION GAP SERPL CALCULATED.3IONS-SCNC: 10 MMOL/L (ref 5–15)
BASOPHILS # BLD AUTO: 0 10*3/MM3 (ref 0–0.2)
BASOPHILS NFR BLD AUTO: 0.4 % (ref 0–1.5)
BH CV ECHO MEAS - ACS: 1.7 CM
BH CV ECHO MEAS - AO MAX PG (FULL): 11.8 MMHG
BH CV ECHO MEAS - AO MAX PG: 22.6 MMHG
BH CV ECHO MEAS - AO MEAN PG (FULL): 8.9 MMHG
BH CV ECHO MEAS - AO MEAN PG: 14.9 MMHG
BH CV ECHO MEAS - AO ROOT AREA (BSA CORRECTED): 1.2
BH CV ECHO MEAS - AO ROOT AREA: 4.7 CM^2
BH CV ECHO MEAS - AO ROOT DIAM: 2.4 CM
BH CV ECHO MEAS - AO V2 MAX: 237.5 CM/SEC
BH CV ECHO MEAS - AO V2 MEAN: 186.9 CM/SEC
BH CV ECHO MEAS - AO V2 VTI: 39.7 CM
BH CV ECHO MEAS - ASC AORTA: 2.5 CM
BH CV ECHO MEAS - AVA(I,A): 2.2 CM^2
BH CV ECHO MEAS - AVA(I,D): 2.2 CM^2
BH CV ECHO MEAS - AVA(V,A): 2 CM^2
BH CV ECHO MEAS - AVA(V,D): 2 CM^2
BH CV ECHO MEAS - BSA(HAYCOCK): 2.3 M^2
BH CV ECHO MEAS - BSA: 2.1 M^2
BH CV ECHO MEAS - BZI_BMI: 44.8 KILOGRAMS/M^2
BH CV ECHO MEAS - BZI_METRIC_HEIGHT: 157.5 CM
BH CV ECHO MEAS - BZI_METRIC_WEIGHT: 111.1 KG
BH CV ECHO MEAS - EDV(CUBED): 86.2 ML
BH CV ECHO MEAS - EDV(MOD-SP2): 47.9 ML
BH CV ECHO MEAS - EDV(MOD-SP4): 57.4 ML
BH CV ECHO MEAS - EDV(TEICH): 88.5 ML
BH CV ECHO MEAS - EF(CUBED): 66.1 %
BH CV ECHO MEAS - EF(MOD-BP): 55 %
BH CV ECHO MEAS - EF(MOD-SP2): 54.7 %
BH CV ECHO MEAS - EF(MOD-SP4): 58.2 %
BH CV ECHO MEAS - EF(TEICH): 57.8 %
BH CV ECHO MEAS - ESV(CUBED): 29.2 ML
BH CV ECHO MEAS - ESV(MOD-SP2): 21.7 ML
BH CV ECHO MEAS - ESV(MOD-SP4): 24 ML
BH CV ECHO MEAS - ESV(TEICH): 37.3 ML
BH CV ECHO MEAS - FS: 30.3 %
BH CV ECHO MEAS - IVS/LVPW: 1.4
BH CV ECHO MEAS - IVSD: 1.1 CM
BH CV ECHO MEAS - LA DIMENSION(2D): 3.1 CM
BH CV ECHO MEAS - LV DIASTOLIC VOL/BSA (35-75): 27.5 ML/M^2
BH CV ECHO MEAS - LV MASS(C)D: 136.9 GRAMS
BH CV ECHO MEAS - LV MASS(C)DI: 65.7 GRAMS/M^2
BH CV ECHO MEAS - LV MAX PG: 10.8 MMHG
BH CV ECHO MEAS - LV MEAN PG: 6.1 MMHG
BH CV ECHO MEAS - LV SYSTOLIC VOL/BSA (12-30): 11.5 ML/M^2
BH CV ECHO MEAS - LV V1 MAX: 164.4 CM/SEC
BH CV ECHO MEAS - LV V1 MEAN: 113.7 CM/SEC
BH CV ECHO MEAS - LV V1 VTI: 29.9 CM
BH CV ECHO MEAS - LVIDD: 4.4 CM
BH CV ECHO MEAS - LVIDS: 3.1 CM
BH CV ECHO MEAS - LVOT AREA: 3 CM^2
BH CV ECHO MEAS - LVOT DIAM: 1.9 CM
BH CV ECHO MEAS - LVPWD: 0.78 CM
BH CV ECHO MEAS - MV A MAX VEL: 106.8 CM/SEC
BH CV ECHO MEAS - MV DEC SLOPE: 309.4 CM/SEC^2
BH CV ECHO MEAS - MV DEC TIME: 0.26 SEC
BH CV ECHO MEAS - MV E MAX VEL: 80.4 CM/SEC
BH CV ECHO MEAS - MV E/A: 0.75
BH CV ECHO MEAS - MV MAX PG: 3.7 MMHG
BH CV ECHO MEAS - MV MEAN PG: 2.3 MMHG
BH CV ECHO MEAS - MV V2 MAX: 96.7 CM/SEC
BH CV ECHO MEAS - MV V2 MEAN: 74.7 CM/SEC
BH CV ECHO MEAS - MV V2 VTI: 22.4 CM
BH CV ECHO MEAS - MVA(VTI): 3.9 CM^2
BH CV ECHO MEAS - PA ACC TIME: 0.17 SEC
BH CV ECHO MEAS - PA MAX PG (FULL): 4.5 MMHG
BH CV ECHO MEAS - PA MAX PG: 7.2 MMHG
BH CV ECHO MEAS - PA MEAN PG (FULL): 2.3 MMHG
BH CV ECHO MEAS - PA MEAN PG: 4.3 MMHG
BH CV ECHO MEAS - PA PR(ACCEL): 4.3 MMHG
BH CV ECHO MEAS - PA V2 MAX: 134 CM/SEC
BH CV ECHO MEAS - PA V2 MEAN: 99.9 CM/SEC
BH CV ECHO MEAS - PA V2 VTI: 24.4 CM
BH CV ECHO MEAS - PVA(I,A): 4.7 CM^2
BH CV ECHO MEAS - PVA(I,D): 4.7 CM^2
BH CV ECHO MEAS - PVA(V,A): 3.7 CM^2
BH CV ECHO MEAS - PVA(V,D): 3.7 CM^2
BH CV ECHO MEAS - QP/QS: 1.3
BH CV ECHO MEAS - RAP SYSTOLE: 8 MMHG
BH CV ECHO MEAS - RV MAX PG: 2.7 MMHG
BH CV ECHO MEAS - RV MEAN PG: 2 MMHG
BH CV ECHO MEAS - RV V1 MAX: 82.4 CM/SEC
BH CV ECHO MEAS - RV V1 MEAN: 67.2 CM/SEC
BH CV ECHO MEAS - RV V1 VTI: 18.9 CM
BH CV ECHO MEAS - RVDD: 3.8 CM
BH CV ECHO MEAS - RVOT AREA: 6.1 CM^2
BH CV ECHO MEAS - RVOT DIAM: 2.8 CM
BH CV ECHO MEAS - RVSP: 53.2 MMHG
BH CV ECHO MEAS - SI(AO): 89.4 ML/M^2
BH CV ECHO MEAS - SI(CUBED): 27.4 ML/M^2
BH CV ECHO MEAS - SI(LVOT): 42.4 ML/M^2
BH CV ECHO MEAS - SI(MOD-SP2): 12.6 ML/M^2
BH CV ECHO MEAS - SI(MOD-SP4): 16 ML/M^2
BH CV ECHO MEAS - SI(TEICH): 24.6 ML/M^2
BH CV ECHO MEAS - SV(AO): 186.4 ML
BH CV ECHO MEAS - SV(CUBED): 57 ML
BH CV ECHO MEAS - SV(LVOT): 88.4 ML
BH CV ECHO MEAS - SV(MOD-SP2): 26.2 ML
BH CV ECHO MEAS - SV(MOD-SP4): 33.4 ML
BH CV ECHO MEAS - SV(RVOT): 114.9 ML
BH CV ECHO MEAS - SV(TEICH): 51.2 ML
BH CV ECHO MEAS - TR MAX VEL: 335.6 CM/SEC
BUN BLD-MCNC: 20 MG/DL (ref 8–23)
BUN/CREAT SERPL: 23.8 (ref 7–25)
CALCIUM SPEC-SCNC: 9.8 MG/DL (ref 8.6–10.5)
CHLORIDE SERPL-SCNC: 107 MMOL/L (ref 98–107)
CHOLEST SERPL-MCNC: 74 MG/DL (ref 0–200)
CO2 SERPL-SCNC: 24 MMOL/L (ref 22–29)
CREAT BLD-MCNC: 0.84 MG/DL (ref 0.57–1)
DEPRECATED RDW RBC AUTO: 39.8 FL (ref 37–54)
EOSINOPHIL # BLD AUTO: 0.1 10*3/MM3 (ref 0–0.4)
EOSINOPHIL NFR BLD AUTO: 1.9 % (ref 0.3–6.2)
ERYTHROCYTE [DISTWIDTH] IN BLOOD BY AUTOMATED COUNT: 12.8 % (ref 12.3–15.4)
FOLATE SERPL-MCNC: 11 NG/ML (ref 4.78–24.2)
GFR SERPL CREATININE-BSD FRML MDRD: 82 ML/MIN/1.73
GLUCOSE BLD-MCNC: 114 MG/DL (ref 65–99)
HBA1C MFR BLD: 5.5 % (ref 3.5–5.6)
HCT VFR BLD AUTO: 29.8 % (ref 34–46.6)
HDLC SERPL-MCNC: 28 MG/DL (ref 40–60)
HEMOCCULT STL QL IA: NEGATIVE
HGB BLD-MCNC: 9.8 G/DL (ref 12–15.9)
IRON 24H UR-MRATE: 36 MCG/DL (ref 37–145)
IRON SATN MFR SERPL: 11 % (ref 20–50)
LDLC SERPL CALC-MCNC: 28 MG/DL (ref 0–100)
LDLC/HDLC SERPL: 1.01 {RATIO}
LV EF 2D ECHO EST: 55 %
LYMPHOCYTES # BLD AUTO: 1.2 10*3/MM3 (ref 0.7–3.1)
LYMPHOCYTES NFR BLD AUTO: 30.7 % (ref 19.6–45.3)
MAGNESIUM SERPL-MCNC: 1.8 MG/DL (ref 1.6–2.4)
MCH RBC QN AUTO: 28.9 PG (ref 26.6–33)
MCHC RBC AUTO-ENTMCNC: 32.8 G/DL (ref 31.5–35.7)
MCV RBC AUTO: 87.9 FL (ref 79–97)
MONOCYTES # BLD AUTO: 0.4 10*3/MM3 (ref 0.1–0.9)
MONOCYTES NFR BLD AUTO: 10.9 % (ref 5–12)
NEUTROPHILS # BLD AUTO: 2.2 10*3/MM3 (ref 1.7–7)
NEUTROPHILS NFR BLD AUTO: 56.1 % (ref 42.7–76)
NRBC BLD AUTO-RTO: 0.1 /100 WBC (ref 0–0.2)
PLATELET # BLD AUTO: 260 10*3/MM3 (ref 140–450)
PMV BLD AUTO: 7.2 FL (ref 6–12)
POTASSIUM BLD-SCNC: 4 MMOL/L (ref 3.5–5.2)
RBC # BLD AUTO: 3.39 10*6/MM3 (ref 3.77–5.28)
SODIUM BLD-SCNC: 141 MMOL/L (ref 136–145)
T3FREE SERPL-MCNC: 11.3 PG/ML (ref 2–4.4)
TIBC SERPL-MCNC: 329 MCG/DL (ref 298–536)
TRANSFERRIN SERPL-MCNC: 221 MG/DL (ref 200–360)
TRIGL SERPL-MCNC: 89 MG/DL (ref 0–150)
TROPONIN T SERPL-MCNC: 0.09 NG/ML (ref 0–0.03)
TROPONIN T SERPL-MCNC: 0.12 NG/ML (ref 0–0.03)
VIT B12 BLD-MCNC: 537 PG/ML (ref 211–946)
VLDLC SERPL-MCNC: 17.8 MG/DL
WBC NRBC COR # BLD: 3.9 10*3/MM3 (ref 3.4–10.8)

## 2020-02-04 PROCEDURE — 82274 ASSAY TEST FOR BLOOD FECAL: CPT | Performed by: FAMILY MEDICINE

## 2020-02-04 PROCEDURE — 25010000002 ENOXAPARIN PER 10 MG: Performed by: NURSE PRACTITIONER

## 2020-02-04 PROCEDURE — 82746 ASSAY OF FOLIC ACID SERUM: CPT | Performed by: FAMILY MEDICINE

## 2020-02-04 PROCEDURE — 93306 TTE W/DOPPLER COMPLETE: CPT

## 2020-02-04 PROCEDURE — 83036 HEMOGLOBIN GLYCOSYLATED A1C: CPT | Performed by: NURSE PRACTITIONER

## 2020-02-04 PROCEDURE — 84466 ASSAY OF TRANSFERRIN: CPT | Performed by: FAMILY MEDICINE

## 2020-02-04 PROCEDURE — 84481 FREE ASSAY (FT-3): CPT | Performed by: FAMILY MEDICINE

## 2020-02-04 PROCEDURE — 84484 ASSAY OF TROPONIN QUANT: CPT | Performed by: INTERNAL MEDICINE

## 2020-02-04 PROCEDURE — 93005 ELECTROCARDIOGRAM TRACING: CPT | Performed by: FAMILY MEDICINE

## 2020-02-04 PROCEDURE — 83520 IMMUNOASSAY QUANT NOS NONAB: CPT | Performed by: FAMILY MEDICINE

## 2020-02-04 PROCEDURE — 82607 VITAMIN B-12: CPT | Performed by: FAMILY MEDICINE

## 2020-02-04 PROCEDURE — 99204 OFFICE O/P NEW MOD 45 MIN: CPT | Performed by: INTERNAL MEDICINE

## 2020-02-04 PROCEDURE — 93306 TTE W/DOPPLER COMPLETE: CPT | Performed by: INTERNAL MEDICINE

## 2020-02-04 PROCEDURE — 83735 ASSAY OF MAGNESIUM: CPT | Performed by: NURSE PRACTITIONER

## 2020-02-04 PROCEDURE — 80061 LIPID PANEL: CPT | Performed by: NURSE PRACTITIONER

## 2020-02-04 PROCEDURE — 96372 THER/PROPH/DIAG INJ SC/IM: CPT

## 2020-02-04 PROCEDURE — 80048 BASIC METABOLIC PNL TOTAL CA: CPT | Performed by: NURSE PRACTITIONER

## 2020-02-04 PROCEDURE — 84484 ASSAY OF TROPONIN QUANT: CPT | Performed by: NURSE PRACTITIONER

## 2020-02-04 PROCEDURE — 83540 ASSAY OF IRON: CPT | Performed by: FAMILY MEDICINE

## 2020-02-04 PROCEDURE — G0378 HOSPITAL OBSERVATION PER HR: HCPCS

## 2020-02-04 PROCEDURE — 99226 PR SBSQ OBSERVATION CARE/DAY 35 MINUTES: CPT | Performed by: FAMILY MEDICINE

## 2020-02-04 PROCEDURE — 86376 MICROSOMAL ANTIBODY EACH: CPT | Performed by: FAMILY MEDICINE

## 2020-02-04 PROCEDURE — 85025 COMPLETE CBC W/AUTO DIFF WBC: CPT | Performed by: NURSE PRACTITIONER

## 2020-02-04 RX ORDER — METHIMAZOLE 5 MG/1
10 TABLET ORAL 2 TIMES DAILY
Status: DISCONTINUED | OUTPATIENT
Start: 2020-02-04 | End: 2020-02-05 | Stop reason: HOSPADM

## 2020-02-04 RX ADMIN — Medication 10 ML: at 20:52

## 2020-02-04 RX ADMIN — Medication 10 ML: at 08:46

## 2020-02-04 RX ADMIN — OYSTER SHELL CALCIUM WITH VITAMIN D 1 TABLET: 500; 200 TABLET, FILM COATED ORAL at 08:45

## 2020-02-04 RX ADMIN — GABAPENTIN 400 MG: 400 CAPSULE ORAL at 08:46

## 2020-02-04 RX ADMIN — MONTELUKAST SODIUM 10 MG: 10 TABLET, FILM COATED ORAL at 20:51

## 2020-02-04 RX ADMIN — LEVETIRACETAM 500 MG: 500 TABLET ORAL at 20:52

## 2020-02-04 RX ADMIN — POTASSIUM CHLORIDE 20 MEQ: 1500 TABLET, EXTENDED RELEASE ORAL at 08:46

## 2020-02-04 RX ADMIN — TOPIRAMATE 200 MG: 100 TABLET, FILM COATED ORAL at 08:46

## 2020-02-04 RX ADMIN — ENOXAPARIN SODIUM 40 MG: 40 INJECTION SUBCUTANEOUS at 08:46

## 2020-02-04 RX ADMIN — GABAPENTIN 400 MG: 400 CAPSULE ORAL at 20:52

## 2020-02-04 RX ADMIN — BUSPIRONE HYDROCHLORIDE 10 MG: 10 TABLET ORAL at 17:04

## 2020-02-04 RX ADMIN — BUSPIRONE HYDROCHLORIDE 10 MG: 10 TABLET ORAL at 20:51

## 2020-02-04 RX ADMIN — METHIMAZOLE 10 MG: 5 TABLET ORAL at 13:25

## 2020-02-04 RX ADMIN — ASPIRIN 81 MG: 81 TABLET, DELAYED RELEASE ORAL at 08:46

## 2020-02-04 RX ADMIN — LEVETIRACETAM 500 MG: 500 TABLET ORAL at 08:46

## 2020-02-04 RX ADMIN — POTASSIUM CHLORIDE 20 MEQ: 1500 TABLET, EXTENDED RELEASE ORAL at 16:22

## 2020-02-04 RX ADMIN — POTASSIUM CHLORIDE 20 MEQ: 1500 TABLET, EXTENDED RELEASE ORAL at 20:52

## 2020-02-04 RX ADMIN — TOPIRAMATE 200 MG: 100 TABLET, FILM COATED ORAL at 20:51

## 2020-02-04 RX ADMIN — TRAZODONE HYDROCHLORIDE 50 MG: 50 TABLET ORAL at 20:51

## 2020-02-04 RX ADMIN — Medication 1 TABLET: at 08:45

## 2020-02-04 RX ADMIN — PAROXETINE HYDROCHLORIDE 20 MG: 20 TABLET, FILM COATED ORAL at 08:45

## 2020-02-04 RX ADMIN — GABAPENTIN 400 MG: 400 CAPSULE ORAL at 16:22

## 2020-02-04 RX ADMIN — BUSPIRONE HYDROCHLORIDE 10 MG: 10 TABLET ORAL at 11:44

## 2020-02-04 RX ADMIN — METHIMAZOLE 10 MG: 5 TABLET ORAL at 21:13

## 2020-02-04 RX ADMIN — BUSPIRONE HYDROCHLORIDE 10 MG: 10 TABLET ORAL at 08:46

## 2020-02-04 RX ADMIN — PANTOPRAZOLE SODIUM 40 MG: 40 TABLET, DELAYED RELEASE ORAL at 08:46

## 2020-02-04 RX ADMIN — ENOXAPARIN SODIUM 40 MG: 40 INJECTION SUBCUTANEOUS at 20:51

## 2020-02-04 NOTE — PLAN OF CARE
Pt on nitroglycerin drip from ER. Pt does not c/o of chest pain or SOA.  Continue to monitor hourly vitals.

## 2020-02-04 NOTE — PLAN OF CARE
Pt off nitro gtt at this time. She denies chest pain. Will monitor closely.    Problem: Patient Care Overview  Goal: Plan of Care Review  Outcome: Ongoing (interventions implemented as appropriate)  Flowsheets (Taken 2/4/2020 0029)  Progress: improving  Plan of Care Reviewed With: patient  Goal: Individualization and Mutuality  Outcome: Ongoing (interventions implemented as appropriate)  Goal: Discharge Needs Assessment  Outcome: Ongoing (interventions implemented as appropriate)  Goal: Interprofessional Rounds/Family Conf  Outcome: Ongoing (interventions implemented as appropriate)     Problem: Fall Risk (Adult)  Goal: Identify Related Risk Factors and Signs and Symptoms  Outcome: Ongoing (interventions implemented as appropriate)  Goal: Absence of Fall  Outcome: Ongoing (interventions implemented as appropriate)  Flowsheets (Taken 2/4/2020 0029)  Absence of Fall: making progress toward outcome     Problem: Skin Injury Risk (Adult)  Goal: Identify Related Risk Factors and Signs and Symptoms  Outcome: Ongoing (interventions implemented as appropriate)  Goal: Skin Health and Integrity  Outcome: Ongoing (interventions implemented as appropriate)  Flowsheets (Taken 2/4/2020 0029)  Skin Health and Integrity: making progress toward outcome

## 2020-02-04 NOTE — CONSULTS
Inpatient Endocrine Consult  Consultation requested by Dr. Kaplan for hyperthyroidism  Patient Care Team:  Ugo Gil MD as PCP - General    Chief Complaint: Hyperthyroidism    HPI: 69-year-old female with history of seizure disorders, sleep apnea, obesity has been having symptoms for the last 2 weeks with shortness of breath which initially was on and off and then had started getting worse to the point that she could not breathe and came to the hospital for further evaluation and management.  She also had complaints of excessive tremors, palpitations, fatigue tired and feeling rundown and weight loss of about 24 pounds in the last 6 to 8 weeks.  She denies any appetite change or insomnia but does admit some heat intolerance.  She has been undergoing work-up for further evaluation and was found to have undetectable TSH with very high free T4.  She denies any thyroid abnormalities in the past and not taking any thyroid medication.  There is no history of amiodarone use.  She did receive a CT scan with PE protocol with contrast on this admission.    Past Medical History:   Diagnosis Date   • Anxiety    • Environmental and seasonal allergies    • GERD (gastroesophageal reflux disease)    • Migraine    • Obesity    • Seizures (CMS/HCC)    • Sleep apnea    • Tobacco dependency        Social History     Socioeconomic History   • Marital status:      Spouse name: Not on file   • Number of children: Not on file   • Years of education: Not on file   • Highest education level: Not on file   Tobacco Use   • Smoking status: Former Smoker     Packs/day: 2.00     Years: 30.00     Pack years: 60.00     Types: Cigarettes     Last attempt to quit: 2020     Years since quittin.0   • Smokeless tobacco: Never Used   Substance and Sexual Activity   • Alcohol use: No     Frequency: Never   • Drug use: Never   • Sexual activity: Defer       Family History   Problem Relation Age of Onset   • Hypertension Mother     • Cancer Sister        Allergies   Allergen Reactions   • Penicillins Itching       ROS:   Constitutional:  Admit fatigue, tiredness.    Eyes:  Denies change in visual acuity   HENT:  Denies nasal congestion or sore throat   Respiratory: denies cough, admit shortness of breath.   Cardiovascular:  denies chest pain, edema   GI:  Denies abdominal pain, nausea, vomiting.   :  Denies Polyuria and Polydipsia  Musculoskeletal:  Denies back pain or joint pain   Integument:  Denies dry skin, rash   Neurologic:  Denies headache, focal weakness or sensory changes   Endocrine:  Denies polyuria or polydipsia   Psychiatric:  Denies depression or anxiety      Vitals:    02/04/20 1002   BP: 128/81   Pulse: 102   Resp: 20   Temp:    SpO2:         General Appearance:    Alert, cooperative, in no acute distress   Head:    Normocephalic, without obvious abnormality, atraumatic           Eyes:    Lids and lashes normal, conjunctivae and sclerae normal,       Throat:   No oral lesions,  oral mucosa moist. Edentulous   Neck:   No adenopathy, supple,  no thyromegaly, no   carotid bruit   Lungs:    Clear     Heart:    Regular rhythm and normal rate   Chest Wall:    No abnormalities observed   Abdomen:     Normal bowel sounds                Extremities:   Moves all extremities well, no edema               Pulses:   Pulses palpable and equal bilaterally   Skin:   Dry   Neurologic:  Psych:    No focal deficit.     AAOx3, appropriate mood, affect normal         Results Review:     I reviewed the patient's new clinical results.    Lab Results   Component Value Date    GLUCOSE 114 (H) 02/04/2020    BUN 20 02/04/2020    CREATININE 0.84 02/04/2020    EGFRIFAFRI 82 02/04/2020    BCR 23.8 02/04/2020    K 4.0 02/04/2020    CO2 24.0 02/04/2020    CALCIUM 9.8 02/04/2020       Lab Results   Component Value Date    HGBA1C 5.5 02/04/2020     Lab Results   Component Value Date    CREATININE 0.84 02/04/2020   Results for GLEN LI (MRN  [id number]) as of 2/4/2020 12:36   Ref. Range 2/3/2020 10:22 2/3/2020 18:10 2/4/2020 04:01   TSH Baseline Latest Ref Range: 0.270 - 4.200 uIU/mL <0.005 (L)     Free T4 Latest Ref Range: 0.93 - 1.70 ng/dL  4.42 (H)            Medication Review: Reviewed.       Current Facility-Administered Medications:   •  acetaminophen (TYLENOL) tablet 650 mg, 650 mg, Oral, Q4H PRN **OR** acetaminophen (TYLENOL) 160 MG/5ML solution 650 mg, 650 mg, Oral, Q4H PRN **OR** acetaminophen (TYLENOL) suppository 650 mg, 650 mg, Rectal, Q4H PRN, Motley, Jenny, APRN  •  aluminum-magnesium hydroxide-simethicone (MAALOX MAX) 400-400-40 MG/5ML suspension 15 mL, 15 mL, Oral, Q6H PRN, Motley, Jenny, APRN  •  aspirin EC tablet 81 mg, 81 mg, Oral, Daily, Motley, Jenny, APRN, 81 mg at 02/04/20 0846  •  bisacodyl (DULCOLAX) suppository 10 mg, 10 mg, Rectal, Daily PRN, Motley, Jenny, APRN  •  busPIRone (BUSPAR) tablet 10 mg, 10 mg, Oral, 4x Daily, Motley, Jenny, APRN, 10 mg at 02/04/20 1144  •  calcium-vitamin D 500-200 MG-UNIT per tablet 1 tablet, 1 tablet, Oral, Daily, Motley, Jenny, APRN, 1 tablet at 02/04/20 0845  •  enoxaparin (LOVENOX) syringe 40 mg, 40 mg, Subcutaneous, Q12H, Motley, Jenny, APRN, 40 mg at 02/04/20 0846  •  gabapentin (NEURONTIN) capsule 400 mg, 400 mg, Oral, TID, Motley, Jenny, APRN, 400 mg at 02/04/20 0846  •  levETIRAcetam (KEPPRA) tablet 500 mg, 500 mg, Oral, BID, Motley, Jenny, APRN, 500 mg at 02/04/20 0846  •  magnesium hydroxide (MILK OF MAGNESIA) suspension 2400 mg/10mL 10 mL, 10 mL, Oral, Daily PRN, Motley, Jenny, APRN  •  Magnesium Sulfate 2 gram infusion - Mg less than or equal to 1.5 mg/dL, 2 g, Intravenous, PRN **OR** Magnesium Sulfate 1 gram infusion - Mg 1.6-1.9 mg/dL, 1 g, Intravenous, PRN, Motley, Jenny, APRN  •  melatonin tablet 5 mg, 5 mg, Oral, Nightly PRN, Motley, Jenny, APRN  •  montelukast (SINGULAIR) tablet 10 mg, 10 mg, Oral, Nightly, Motley, Jenny, APRN, 10 mg at 02/03/20 2120  •   multivitamin-iron-minerals-folic acid (CENTRUM) chewable tablet 1 tablet, 1 tablet, Oral, Daily, Motley, Jenny, APRN, 1 tablet at 02/04/20 0845  •  nitroglycerin (NITROSTAT) SL tablet 0.4 mg, 0.4 mg, Sublingual, Q5 Min PRN, Motley, Jenny, APRN  •  nitroglycerin 50 mg/250 mL (0.2 mg/mL) infusion, 5-200 mcg/min, Intravenous, Titrated, Motley, Jenny, APRN, Stopped at 02/03/20 2135  •  ondansetron (ZOFRAN) tablet 4 mg, 4 mg, Oral, Q6H PRN **OR** ondansetron (ZOFRAN) injection 4 mg, 4 mg, Intravenous, Q6H PRN, Motley, Jenny, APRN  •  pantoprazole (PROTONIX) EC tablet 40 mg, 40 mg, Oral, QAM, Motley, Jenny, APRN, 40 mg at 02/04/20 0846  •  PARoxetine (PAXIL) tablet 20 mg, 20 mg, Oral, QAM, Motley, Jenny, APRN, 20 mg at 02/04/20 0845  •  potassium chloride (K-DUR,KLOR-CON) CR tablet 20 mEq, 20 mEq, Oral, TID, Motley, Jenny, APRN, 20 mEq at 02/04/20 0846  •  potassium chloride (K-DUR,KLOR-CON) CR tablet 40 mEq, 40 mEq, Oral, PRN, Motley, Jenny, APRN  •  [COMPLETED] Insert peripheral IV, , , Once **AND** sodium chloride 0.9 % flush 10 mL, 10 mL, Intravenous, PRN, Motley, Jenny, APRN  •  sodium chloride 0.9 % flush 10 mL, 10 mL, Intravenous, Q12H, Motley, Jenny, APRN, 10 mL at 02/04/20 0846  •  sodium chloride 0.9 % flush 10 mL, 10 mL, Intravenous, PRN, Motley, Jenny, APRN  •  topiramate (TOPAMAX) tablet 200 mg, 200 mg, Oral, BID, Motley, Jenny, APRN, 200 mg at 02/04/20 0846  •  traZODone (DESYREL) tablet 50 mg, 50 mg, Oral, Nightly, Jenny Motley, SHASTA, 50 mg at 02/03/20 2120    Assessment/Plan   Hyperthyroidism: Moderate to severe, very symptomatic.  Etiology unclear at this time, most likely Graves' disease but could have toxic nodular goiter.  Thyroid receptor antibodies as well as thyroid peroxidase antibodies has been ordered but pending.  We cannot do thyroid scan and uptake since she had CT scan with contrast on this admission to rule out PE.  Given that she is very symptomatic and required hospitalization with very  high free T4 and undetectable TSH, I will go ahead and add Tapazole 10 mg twice a day.  I have explained this to the patient and her son.  Once she is euthyroid we will take the Tapazole off and arrange for thyroid scan and uptake.  Side effects of Tapazole including but not limited to hepatitis, frequent infection, abdominal pain, nausea, vomiting, agranulocytosis discussed with both of them.  She is advised to call if she develops any side effects.  I will check CMP.  She will further need follow-up in the office in about 4 to 6 weeks post discharge.    Thank you very much for the consultation.             Mile Martinez MD FACE.

## 2020-02-04 NOTE — PROGRESS NOTES
Discharge Planning Assessment   [facility]     Patient Name: Stefania Marie  MRN: [id number]  Today's Date: 2/4/2020    Admit Date: 2/3/2020    Discharge Needs Assessment     Row Name 02/04/20 1438       Living Environment    Lives With  alone Lives in Senior apartment    Current Living Arrangements  home/apartment/condo    Primary Care Provided by  self    Able to Return to Prior Arrangements  yes       Resource/Environmental Concerns    Resource/Environmental Concerns  none       Transition Planning    Patient/Family Anticipates Transition to  home    Patient/Family Anticipated Services at Transition  none    Transportation Anticipated  family or friend will provide       Discharge Needs Assessment    Readmission Within the Last 30 Days  no previous admission in last 30 days    Concerns to be Addressed  no discharge needs identified    Equipment Currently Used at Home  bipap/cpap;grab bar        Discharge Plan     Row Name 02/04/20 1440       Plan    Plan  Routine d/c to home         Demographic Summary     Row Name 02/04/20 1437       General Information    Admission Type  observation    Arrived From  emergency department    Required Notices Provided  Observation Status Notice    Referral Source  admission list    Reason for Consult  discharge planning    Preferred Language  English        Functional Status     Row Name 02/04/20 1437       Functional Status, IADL    Medications  independent    Meal Preparation  independent    Housekeeping  independent    Laundry  independent    Shopping  independent        DC Barriers - Plan for Echo today    Tatyana Alford RN, CM  Office Phone [phone]  Cell [phone]

## 2020-02-04 NOTE — CONSULTS
Referring Provider: Dr. Gil    Attending: Jass Kaplan,*    Reason for Consultation:    Shortness of breath  Jaw pain  History of seizure  Obstructive sleep apnea  Obesity    Chief complaint:    Shortness of breath       History of present illness:     Patient is 69-year-old -American female whose past medical history is significant for obstructive sleep apnea, obesity, complex partial seizures, migraine, gastroesophageal reflux disease, who is admitted with symptom of shortness of breath.    Patient reports that from last 2 to 3 weeks she is getting short of breath.  However during the last 2 to 3 days patient became extremely short of breath and was not able to walk short distance with it at home.  However she is not very active person.  Patient denies any symptoms of chest discomfort.  She does have occasional heaviness across the chest.  Jaw pain is reported.  No significant leg edema.    Patient does not have previous history of CAD, PCI or MI.    Review of Systems  Review of Systems   Constitution: Negative for chills and fever.   HENT: Negative for ear discharge and nosebleeds.    Eyes: Negative for discharge and redness.   Cardiovascular: Positive for chest pain. Negative for orthopnea, palpitations, paroxysmal nocturnal dyspnea and syncope.   Respiratory: Positive for shortness of breath. Negative for cough and wheezing.    Endocrine: Negative for heat intolerance.   Skin: Negative for rash.   Musculoskeletal: Positive for arthritis and joint pain. Negative for myalgias.   Gastrointestinal: Negative for abdominal pain, melena, nausea and vomiting.   Genitourinary: Negative for dysuria and hematuria.   Neurological: Negative for dizziness, light-headedness, numbness and tremors.   Psychiatric/Behavioral: Negative for depression. The patient is not nervous/anxious.        Past Medical History  Past Medical History:   Diagnosis Date   • Anxiety    • Environmental and seasonal allergies    •  "GERD (gastroesophageal reflux disease)    • Migraine    • Obesity    • Seizures (CMS/HCC)    • Sleep apnea    • Tobacco dependency     and Past Surgical History:   Procedure Laterality Date   • GALLBLADDER SURGERY     • TUBAL ABDOMINAL LIGATION         Family History  Family History   Problem Relation Age of Onset   • Hypertension Mother    • Cancer Sister        Social History  Social History     Socioeconomic History   • Marital status:      Spouse name: Not on file   • Number of children: Not on file   • Years of education: Not on file   • Highest education level: Not on file   Tobacco Use   • Smoking status: Former Smoker     Packs/day: 2.00     Years: 30.00     Pack years: 60.00     Types: Cigarettes     Last attempt to quit: 2020     Years since quittin.0   • Smokeless tobacco: Never Used   Substance and Sexual Activity   • Alcohol use: No     Frequency: Never   • Drug use: Never   • Sexual activity: Defer       Objective     Physical Exam:    Vital Signs  Vitals:    20 0411 20 0649 20 1002 20 1454   BP: 136/63 139/67 128/81 136/61   BP Location:    Right arm   Patient Position:    Lying   Pulse:  104 102 93   Resp:   20 16   Temp:  98.4 °F (36.9 °C)  98 °F (36.7 °C)   TempSrc:    Oral   SpO2:    95%   Weight:   111 kg (245 lb)    Height:   157.5 cm (62\")        Weight  Flowsheet Rows      First Filed Value   Admission Height  157.5 cm (62\") Documented at 2020 0936   Admission Weight  111 kg (245 lb 2.4 oz) Documented at 2020 0936           Physical Exam   Constitutional: She is oriented to person, place, and time. She appears well-developed and well-nourished.   HENT:   Head: Normocephalic and atraumatic.   Eyes: Right eye exhibits no discharge. No scleral icterus.   Neck: No thyromegaly present.   Cardiovascular: Normal rate, regular rhythm and normal heart sounds. Exam reveals no gallop and no friction rub.   No murmur heard.  Pulmonary/Chest: Effort " normal and breath sounds normal. No respiratory distress. She has no wheezes. She has no rales.   Abdominal: There is no tenderness.   Musculoskeletal: She exhibits no edema.   Lymphadenopathy:     She has no cervical adenopathy.   Neurological: She is alert and oriented to person, place, and time.   Skin: No rash noted. No erythema.   Psychiatric: She has a normal mood and affect.       Results Review:  Lab Results (last 24 hours)     Procedure Component Value Units Date/Time    Blood Culture - Blood, Hand, Right [[id number]] Collected:  02/03/20 1313    Specimen:  Blood from Hand, Right Updated:  02/04/20 1330     Blood Culture No growth at 24 hours    Blood Culture - Blood, Arm, Right [[id number]] Collected:  02/03/20 1305    Specimen:  Blood from Arm, Right Updated:  02/04/20 1330     Blood Culture No growth at 24 hours    Folate [[id number]]  (Normal) Collected:  02/04/20 0400    Specimen:  Blood Updated:  02/04/20 1126     Folate 11.00 ng/mL     Narrative:       Results may be falsely increased if patient taking Biotin.      Vitamin B12 [[id number]]  (Normal) Collected:  02/04/20 0400    Specimen:  Blood Updated:  02/04/20 1126     Vitamin B-12 537 pg/mL     Narrative:       Results may be falsely increased if patient taking Biotin.      Hemoglobin A1c [[id number]]  (Normal) Collected:  02/04/20 0401    Specimen:  Blood Updated:  02/04/20 1112     Hemoglobin A1C 5.5 %     Narrative:       Hemoglobin A1C Reference Range:    <5.7 %        Normal  5.7-6.4 %     Increased risk for diabetes  > 6.4 %        Diabetes       These guidelines have been recommended by the American Diabetic Association for Hgb A1c.      The following 2010 guidelines have been recommended by the American Diabetes Association for Hemoglobin A1c.    HBA1c 5.7-6.4% Increased risk for future diabetes (pre-diabetes)  HBA1c     >6.4% Diabetes      T3, Free [[id number]] Collected:  02/04/20 0400    Specimen:  Blood Updated:  02/04/20 0930     Thyrotropin Receptor Antibody [[id number]] Collected:  02/04/20 0849    Specimen:  Blood Updated:  02/04/20 0907    Thyroid Peroxidase Antibody [[id number]] Collected:  02/04/20 0849    Specimen:  Blood Updated:  02/04/20 0907    Basic Metabolic Panel [[id number]]  (Abnormal) Collected:  02/04/20 0400    Specimen:  Blood Updated:  02/04/20 0545     Glucose 114 mg/dL      BUN 20 mg/dL      Creatinine 0.84 mg/dL      Sodium 141 mmol/L      Potassium 4.0 mmol/L      Chloride 107 mmol/L      CO2 24.0 mmol/L      Calcium 9.8 mg/dL      eGFR   Amer 82 mL/min/1.73      BUN/Creatinine Ratio 23.8     Anion Gap 10.0 mmol/L     Narrative:       GFR Normal >60  Chronic Kidney Disease <60  Kidney Failure <15      Lipid Panel [[id number]]  (Abnormal) Collected:  02/04/20 0400    Specimen:  Blood Updated:  02/04/20 0545     Total Cholesterol 74 mg/dL      Triglycerides 89 mg/dL      HDL Cholesterol 28 mg/dL      LDL Cholesterol  28 mg/dL      VLDL Cholesterol 17.8 mg/dL      LDL/HDL Ratio 1.01    Narrative:       Cholesterol Reference Ranges  (U.S. Department of Health and Human Services ATP III Classifications)    Desirable          <200 mg/dL  Borderline High    200-239 mg/dL  High Risk          >240 mg/dL      Triglyceride Reference Ranges  (U.S. Department of Health and Human Services ATP III Classifications)    Normal           <150 mg/dL  Borderline High  150-199 mg/dL  High             200-499 mg/dL  Very High        >500 mg/dL    HDL Reference Ranges  (U.S. Department of Health and Human Services ATP III Classifcations)    Low     <40 mg/dl (major risk factor for CHD)  High    >60 mg/dl ('negative' risk factor for CHD)        LDL Reference Ranges  (U.S. Department of Health and Human Services ATP III Classifcations)    Optimal          <100 mg/dL  Near Optimal     100-129 mg/dL  Borderline High  130-159 mg/dL  High             160-189 mg/dL  Very High        >189 mg/dL    Iron Profile [[id number]]  (Abnormal) Collected:   02/04/20 0400    Specimen:  Blood Updated:  02/04/20 0545     Iron 36 mcg/dL      Iron Saturation 11 %      Transferrin 221 mg/dL      TIBC 329 mcg/dL     Magnesium [[id number]]  (Normal) Collected:  02/04/20 0400    Specimen:  Blood Updated:  02/04/20 0545     Magnesium 1.8 mg/dL     CBC Auto Differential [[id number]]  (Abnormal) Collected:  02/04/20 0400    Specimen:  Blood Updated:  02/04/20 0417     WBC 3.90 10*3/mm3      RBC 3.39 10*6/mm3      Hemoglobin 9.8 g/dL      Hematocrit 29.8 %      MCV 87.9 fL      MCH 28.9 pg      MCHC 32.8 g/dL      RDW 12.8 %      RDW-SD 39.8 fl      MPV 7.2 fL      Platelets 260 10*3/mm3      Neutrophil % 56.1 %      Lymphocyte % 30.7 %      Monocyte % 10.9 %      Eosinophil % 1.9 %      Basophil % 0.4 %      Neutrophils, Absolute 2.20 10*3/mm3      Lymphocytes, Absolute 1.20 10*3/mm3      Monocytes, Absolute 0.40 10*3/mm3      Eosinophils, Absolute 0.10 10*3/mm3      Basophils, Absolute 0.00 10*3/mm3      nRBC 0.1 /100 WBC     Occult Blood, Fecal By Immunoassay - Stool, Per Rectum [[id number]]  (Normal) Collected:  02/04/20 0047    Specimen:  Stool from Per Rectum Updated:  02/04/20 0211     Occult Blood, Fecal by Immunoassay Negative    Troponin [[id number]]  (Abnormal) Collected:  02/04/20 0020    Specimen:  Blood Updated:  02/04/20 0109     Troponin T 0.117 ng/mL     Narrative:       Troponin T Reference Range:  <= 0.03 ng/mL-   Negative for AMI  >0.03 ng/mL-     Abnormal for myocardial necrosis.  Clinicians would have to utilize clinical acumen, EKG, Troponin and serial changes to determine if it is an Acute Myocardial Infarction or myocardial injury due to an underlying chronic condition.       Results may be falsely decreased if patient taking Biotin.      T4, Free [[id number]]  (Abnormal) Collected:  02/03/20 1810    Specimen:  Blood Updated:  02/03/20 1931     Free T4 4.42 ng/dL     Narrative:       Results may be falsely increased if patient taking Biotin.      BNP [[id number]]   (Abnormal) Collected:  02/03/20 1810    Specimen:  Blood Updated:  02/03/20 1931     proBNP 1,896.0 pg/mL     Narrative:       Among patients with dyspnea, NT-proBNP is highly sensitive for the detection of acute congestive heart failure. In addition NT-proBNP of <300 pg/ml effectively rules out acute congestive heart failure with 99% negative predictive value.    Results may be falsely decreased if patient taking Biotin.      Troponin [[id number]]  (Abnormal) Collected:  02/03/20 1810    Specimen:  Blood Updated:  02/03/20 1848     Troponin T 0.095 ng/mL     Narrative:       Troponin T Reference Range:  <= 0.03 ng/mL-   Negative for AMI  >0.03 ng/mL-     Abnormal for myocardial necrosis.  Clinicians would have to utilize clinical acumen, EKG, Troponin and serial changes to determine if it is an Acute Myocardial Infarction or myocardial injury due to an underlying chronic condition.       Results may be falsely decreased if patient taking Biotin.          Imaging Results (Last 72 Hours)     Procedure Component Value Units Date/Time    CT Chest Pulmonary Embolism [[id number]] Collected:  02/03/20 1245     Updated:  02/03/20 1251    Narrative:       CT CHEST PULMONARY EMBOLISM-     Date of Exam: 2/3/2020 12:28 PM     Indication: soa.  Patient's a 67-year-old female with history of  shortness of breath elevated d-dimer, patient quit smoking 3 years ago     Comparison: None available.     Technique: Serial and axial CT images of the chest were obtained  following the uneventful intravenous administration of 100 contrast.  Reconstructions in the coronal and sagittal planes were also performed.   Automated exposure control and iterative reconstruction methods were  used.     FINDINGS:     Today's CT consistent sequential 3 mm helical images through the chest  today's study fails to demonstrate filling defect within the pulmonary  arterial tree that would be suspicious for pulmonary embolus the ace  ascending thoracic aorta  measures 29 mm in diameter the descending  thoracic aorta measures 25 mm in diameter there is no evidence of  thoracic aortic aneurysm or dissection. No mediastinal or hilar  lymphadenopathy is seen there is at least mild coronary artery  calcification present. The upper abdomen demonstrates no adrenal  enlargement change of cholecystectomy is seen no acute upper abdominal  finding is present.     The lung windows on today's study failed to demonstrate evidence of  pulmonary infiltrate or mass. There is no evidence of pleural effusion  or pleural thickening.  Bone Windows on today's study demonstrate degenerative changes to the  thoracic spine. There is no evidence of fracture. 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        Impression:       No evidence of pulmonary embolus or thoracic aortic aneurysm or  dissection  No evidence of adenopathy or mass, no acute/ active cardiopulmonary  disease is present.  Changes of dish syndrome, upper abdomen is unremarkable with change of  cholecystectomy              Electronically Signed By-Gonzalo Grimaldo Jr. On:2/3/2020 12:49 PM  This report was finalized on [id number] by  Gonzalo Grimaldo Jr., .    XR Chest 2 View [[id number]] Collected:  02/03/20 1100     Updated:  02/03/20 1103    Narrative:       DATE OF EXAM:  2/3/2020 10:50 AM     PROCEDURE:  XR CHEST 2 VW-     INDICATIONS:  Shortness of breath today with weakness. Former smoker quit 3 weeks ago.        COMPARISON:  None     TECHNIQUE:   Two radiologic views of the chest.     FINDINGS:  No acute airspace disease is seen. Heart size is borderline enlarged.  Pulmonary vascular distribution is normal. No acute osseous abnormality.  No pleural effusion or pneumothorax is identified. Degenerative changes  of both shoulders. Mild to moderate degenerative changes in the thoracic  spine. Cholecystectomy. Benign calcified lymph nodes in each hilar  region.       Impression:          1. No acute chest findings.  2. Borderline cardiac  enlargement.     Electronically Signed By-Dr. Deann Salinas MD On:2/3/2020 11:01 AM  This report was finalized on [id number] by Dr. Deann Salinas MD.        ECG 12 Lead   Preliminary Result   HEART RATE= 93  bpm   RR Interval= 644  ms   MT Interval= 159  ms   P Horizontal Axis= -3  deg   P Front Axis= 53  deg   QRSD Interval= 76  ms   QT Interval= 360  ms   QRS Axis= -14  deg   T Wave Axis= -11  deg   - ABNORMAL ECG -   Sinus rhythm   Inferior infarct, old   When compared with ECG of 03-Feb-2020 9:59:01,   No significant change   Electronically Signed By:    Date and Time of Study: 2020-02-04 12:30:40      ECG 12 Lead   Final Result   HEART RATE= 100  bpm   RR Interval= 600  ms   MT Interval= 150  ms   P Horizontal Axis= -10  deg   P Front Axis= 57  deg   QRSD Interval= 74  ms   QT Interval= 341  ms   QRS Axis= -16  deg   T Wave Axis= -17  deg   - ABNORMAL ECG -   Sinus tachycardia   Inferior infarct, old   When compared with ECG of 05-Sep-2018 1:03:40,   Electronically Signed By: Matthew Null (RICA) 03-Feb-2020 17:50:54   Date and Time of Study: 2020-02-03 09:59:01        CBC    Results from last 7 days   Lab Units 02/04/20  0400 02/03/20  1022   WBC 10*3/mm3 3.90 4.60   HEMOGLOBIN g/dL 9.8* 10.5*   PLATELETS 10*3/mm3 260 263     BMP   Results from last 7 days   Lab Units 02/04/20  0400 02/03/20  1022   SODIUM mmol/L 141 143   POTASSIUM mmol/L 4.0 4.6   CHLORIDE mmol/L 107 107   CO2 mmol/L 24.0 22.0   BUN mg/dL 20 22   CREATININE mg/dL 0.84 0.81   GLUCOSE mg/dL 114* 101*   MAGNESIUM mg/dL 1.8  --      CMP   Results from last 7 days   Lab Units 02/04/20  0400 02/03/20  1022   SODIUM mmol/L 141 143   POTASSIUM mmol/L 4.0 4.6   CHLORIDE mmol/L 107 107   CO2 mmol/L 24.0 22.0   BUN mg/dL 20 22   CREATININE mg/dL 0.84 0.81   GLUCOSE mg/dL 114* 101*     Medication Review  Scheduled Meds:  aspirin 81 mg Oral Daily   busPIRone 10 mg Oral 4x Daily   calcium-vitamin D 1 tablet Oral Daily   enoxaparin 40 mg Subcutaneous  Q12H   gabapentin 400 mg Oral TID   levETIRAcetam 500 mg Oral BID   methIMAzole 10 mg Oral BID   montelukast 10 mg Oral Nightly   multivitamin-iron-minerals-folic acid 1 tablet Oral Daily   pantoprazole 40 mg Oral QAM   PARoxetine 20 mg Oral QAM   potassium chloride 20 mEq Oral TID   sodium chloride 10 mL Intravenous Q12H   topiramate 200 mg Oral BID   traZODone 50 mg Oral Nightly     Continuous Infusions:  nitroglycerin 5-200 mcg/min Last Rate: Stopped (02/03/20 2135)     PRN Meds:.•  acetaminophen **OR** acetaminophen **OR** acetaminophen  •  aluminum-magnesium hydroxide-simethicone  •  bisacodyl  •  magnesium hydroxide  •  magnesium sulfate **OR** magnesium sulfate in D5W 1g/100mL (PREMIX)  •  melatonin  •  nitroglycerin  •  ondansetron **OR** ondansetron  •  potassium chloride  •  [COMPLETED] Insert peripheral IV **AND** sodium chloride  •  sodium chloride    Assessment:      Shortness of breath    Sleep apnea    Complex partial seizure (CMS/HCC)    Migraine    Anxiety    Morbid obesity (CMS/HCC)    Elevated troponin    Pain in lower jaw    GERD (gastroesophageal reflux disease)    Tobacco dependency    Normocytic anemia        Plan:    Patient is admitted with symptom of shortness of breath and jaw pain.  Her troponins are borderline elevated.  Patient is not reporting any symptom of angina pectoris.  Patient is complaining of shortness of breath.  Patient underwent CT PE protocol and it was negative for pulmonary embolism.  EKG showed no significant ST changes.  I would recommend that patient should proceed with stress test to risk stratify for coronary artery disease prior to cardiac catheterization.  However if troponins continues to be elevated I would consider cardiac catheterization tomorrow.    I discussed the patients findings and my recommendations with patient and her son and registered nurse    Electronically signed by Alberto Richardson MD, 02/04/20, 3:45 PM.      Alberto Richardson MD  02/04/20  3:37  PM

## 2020-02-04 NOTE — PROGRESS NOTES
[facility] Medicine Services Daily Progress Note      Hospitalist Team  LOS 0 days      Patient Care Team:  Ugo Gil MD as PCP - General    Patient Location: 273/1      Subjective   Subjective     Chief Complaint / Subjective  Chief Complaint   Patient presents with   • Shortness of Breath     Patient reports breathing much improved but still feeling generally fatigued.  Patient found with a very low TSH and very high free T4 no history of hypothyroidism or goiter.  Patient also had troponins still elevated but denies any chest pain.  No nausea or vomiting no difficulty swallowing or voice changes.    Brief Synopsis of Hospital Course/HPI  Ms. Marie is a 69 y.o. morbidly obese  female with a history of sleep apnea, anxiety, complex partial seizure, migraines, and GERD who presents to [facility] ED on 02/03/2020 complaining of shortness of breath. The patient states her symptoms started approximately 3 weeks ago and have gotten progressively worse. She denies any chest pain. She states the shortness of breath is intermittent and worse with exertion. She denies any alleviating factors. She denies a history of coronary artery disease, HLD, or DMII. She reports a remote history of HTN, but no longer takes antihypertensives. She is a heavy smoker with a 60 pack year history. She reports a family history of heart disease. She denies any previous cardiac workup.      The patient is also complaining of left jaw pain. She reports a recent left lower molar tooth infection with foul odor and moderate to severe left jaw pain. She states she cannot afford to go to the dentist. She denies any recent antibiotic use, but states she has been swishing with Peroxide at home.      Upon arrival to the ER the patient was given aspirin 324 mg PO. Her troponin is elevated at 0.118. Her dimer is also elevated at 1.57. Her EKG shows sinus tachycardia with old infarct. Her CT chest  "was negative for PE. Her CXR showed no acute findings. Her labs are significant for hgb 10.5, CRP 0.67, and ESR 60. Blood cultures were obtained. She was started on Tridil drip. She was also given normal saline 500 cc. She will be admitted for observation and further evaluation.             Date::          Review of Systems   Constitution: Positive for malaise/fatigue. Negative for chills and fever.   HENT: Negative for hoarse voice, odynophagia and sore throat.    Eyes: Negative for double vision and photophobia.   Cardiovascular: Positive for dyspnea on exertion. Negative for chest pain and syncope.   Respiratory: Negative for cough and shortness of breath.    Musculoskeletal: Negative for arthritis and joint swelling.   Gastrointestinal: Negative for nausea and vomiting.   Neurological: Positive for light-headedness. Negative for focal weakness.   Psychiatric/Behavioral: Negative for altered mental status. The patient is not nervous/anxious.          Objective   Objective      Vital Signs  Temp:  [97.7 °F (36.5 °C)-98.9 °F (37.2 °C)] 98 °F (36.7 °C)  Heart Rate:  [] 93  Resp:  [16-24] 16  BP: (112-170)/(46-84) 136/61  Oxygen Therapy  SpO2: 95 %  Pulse Oximetry Type: Intermittent  Device (Oxygen Therapy): room air  Flowsheet Rows      First Filed Value   Admission Height  157.5 cm (62\") Documented at 02/03/2020 0936   Admission Weight  111 kg (245 lb 2.4 oz) Documented at 02/03/2020 0936        Intake & Output (last 3 days)       02/01 0701 - 02/02 0700 02/02 0701 - 02/03 0700 02/03 0701 - 02/04 0700 02/04 0701 - 02/05 0700    Urine (mL/kg/hr)   450     Stool   0     Total Output   450     Net   -450             Stool Unmeasured Occurrence   1 x         Lines, Drains & Airways    Active LDAs     Name:   Placement date:   Placement time:   Site:   Days:    Peripheral IV 02/03/20 1022 Left Antecubital   02/03/20    1022    Antecubital   1                  Physical Exam:    Physical Exam  General: Morbidly " obese female lying in bed breathing comfortably on room air no acute distress  HEENT: NC/AT, EOMI, mucosa moist  Heart: Sinus tachycardia  Chest: CTAB, no w/r/r, normal respiratory effort  Abdominal: Soft. NT/ND. Bowel sounds present  Musculoskeletal: Normal ROM.  Trace pitting edema bilaterally.  No calf tenderness.  Neurological: AAOx3, no focal deficits  Skin: Skin is warm and dry. No rash  Psychiatric: Normal mood and affect.          Procedures:              Results Review:     I reviewed the patient's new clinical results.      Lab Results (last 24 hours)     Procedure Component Value Units Date/Time    Blood Culture - Blood, Hand, Right [[id number]] Collected:  02/03/20 1313    Specimen:  Blood from Hand, Right Updated:  02/04/20 1330     Blood Culture No growth at 24 hours    Blood Culture - Blood, Arm, Right [[id number]] Collected:  02/03/20 1305    Specimen:  Blood from Arm, Right Updated:  02/04/20 1330     Blood Culture No growth at 24 hours    Folate [[id number]]  (Normal) Collected:  02/04/20 0400    Specimen:  Blood Updated:  02/04/20 1126     Folate 11.00 ng/mL     Narrative:       Results may be falsely increased if patient taking Biotin.      Vitamin B12 [[id number]]  (Normal) Collected:  02/04/20 0400    Specimen:  Blood Updated:  02/04/20 1126     Vitamin B-12 537 pg/mL     Narrative:       Results may be falsely increased if patient taking Biotin.      Hemoglobin A1c [[id number]]  (Normal) Collected:  02/04/20 0401    Specimen:  Blood Updated:  02/04/20 1112     Hemoglobin A1C 5.5 %     Narrative:       Hemoglobin A1C Reference Range:    <5.7 %        Normal  5.7-6.4 %     Increased risk for diabetes  > 6.4 %        Diabetes       These guidelines have been recommended by the American Diabetic Association for Hgb A1c.      The following 2010 guidelines have been recommended by the American Diabetes Association for Hemoglobin A1c.    HBA1c 5.7-6.4% Increased risk for future diabetes  (pre-diabetes)  HBA1c     >6.4% Diabetes      T3, Free [[id number]] Collected:  02/04/20 0400    Specimen:  Blood Updated:  02/04/20 0930    Thyrotropin Receptor Antibody [[id number]] Collected:  02/04/20 0849    Specimen:  Blood Updated:  02/04/20 0907    Thyroid Peroxidase Antibody [[id number]] Collected:  02/04/20 0849    Specimen:  Blood Updated:  02/04/20 0907    Basic Metabolic Panel [[id number]]  (Abnormal) Collected:  02/04/20 0400    Specimen:  Blood Updated:  02/04/20 0545     Glucose 114 mg/dL      BUN 20 mg/dL      Creatinine 0.84 mg/dL      Sodium 141 mmol/L      Potassium 4.0 mmol/L      Chloride 107 mmol/L      CO2 24.0 mmol/L      Calcium 9.8 mg/dL      eGFR   Amer 82 mL/min/1.73      BUN/Creatinine Ratio 23.8     Anion Gap 10.0 mmol/L     Narrative:       GFR Normal >60  Chronic Kidney Disease <60  Kidney Failure <15      Lipid Panel [[id number]]  (Abnormal) Collected:  02/04/20 0400    Specimen:  Blood Updated:  02/04/20 0545     Total Cholesterol 74 mg/dL      Triglycerides 89 mg/dL      HDL Cholesterol 28 mg/dL      LDL Cholesterol  28 mg/dL      VLDL Cholesterol 17.8 mg/dL      LDL/HDL Ratio 1.01    Narrative:       Cholesterol Reference Ranges  (U.S. Department of Health and Human Services ATP III Classifications)    Desirable          <200 mg/dL  Borderline High    200-239 mg/dL  High Risk          >240 mg/dL      Triglyceride Reference Ranges  (U.S. Department of Health and Human Services ATP III Classifications)    Normal           <150 mg/dL  Borderline High  150-199 mg/dL  High             200-499 mg/dL  Very High        >500 mg/dL    HDL Reference Ranges  (U.S. Department of Health and Human Services ATP III Classifcations)    Low     <40 mg/dl (major risk factor for CHD)  High    >60 mg/dl ('negative' risk factor for CHD)        LDL Reference Ranges  (U.S. Department of Health and Human Services ATP III Classifcations)    Optimal          <100 mg/dL  Near Optimal     100-129  mg/dL  Borderline High  130-159 mg/dL  High             160-189 mg/dL  Very High        >189 mg/dL    Iron Profile [[id number]]  (Abnormal) Collected:  02/04/20 0400    Specimen:  Blood Updated:  02/04/20 0545     Iron 36 mcg/dL      Iron Saturation 11 %      Transferrin 221 mg/dL      TIBC 329 mcg/dL     Magnesium [[id number]]  (Normal) Collected:  02/04/20 0400    Specimen:  Blood Updated:  02/04/20 0545     Magnesium 1.8 mg/dL     CBC Auto Differential [[id number]]  (Abnormal) Collected:  02/04/20 0400    Specimen:  Blood Updated:  02/04/20 0417     WBC 3.90 10*3/mm3      RBC 3.39 10*6/mm3      Hemoglobin 9.8 g/dL      Hematocrit 29.8 %      MCV 87.9 fL      MCH 28.9 pg      MCHC 32.8 g/dL      RDW 12.8 %      RDW-SD 39.8 fl      MPV 7.2 fL      Platelets 260 10*3/mm3      Neutrophil % 56.1 %      Lymphocyte % 30.7 %      Monocyte % 10.9 %      Eosinophil % 1.9 %      Basophil % 0.4 %      Neutrophils, Absolute 2.20 10*3/mm3      Lymphocytes, Absolute 1.20 10*3/mm3      Monocytes, Absolute 0.40 10*3/mm3      Eosinophils, Absolute 0.10 10*3/mm3      Basophils, Absolute 0.00 10*3/mm3      nRBC 0.1 /100 WBC     Occult Blood, Fecal By Immunoassay - Stool, Per Rectum [[id number]]  (Normal) Collected:  02/04/20 0047    Specimen:  Stool from Per Rectum Updated:  02/04/20 0211     Occult Blood, Fecal by Immunoassay Negative    Troponin [[id number]]  (Abnormal) Collected:  02/04/20 0020    Specimen:  Blood Updated:  02/04/20 0109     Troponin T 0.117 ng/mL     Narrative:       Troponin T Reference Range:  <= 0.03 ng/mL-   Negative for AMI  >0.03 ng/mL-     Abnormal for myocardial necrosis.  Clinicians would have to utilize clinical acumen, EKG, Troponin and serial changes to determine if it is an Acute Myocardial Infarction or myocardial injury due to an underlying chronic condition.       Results may be falsely decreased if patient taking Biotin.      T4, Free [[id number]]  (Abnormal) Collected:  02/03/20 1810    Specimen:   Blood Updated:  02/03/20 1931     Free T4 4.42 ng/dL     Narrative:       Results may be falsely increased if patient taking Biotin.      BNP [[id number]]  (Abnormal) Collected:  02/03/20 1810    Specimen:  Blood Updated:  02/03/20 1931     proBNP 1,896.0 pg/mL     Narrative:       Among patients with dyspnea, NT-proBNP is highly sensitive for the detection of acute congestive heart failure. In addition NT-proBNP of <300 pg/ml effectively rules out acute congestive heart failure with 99% negative predictive value.    Results may be falsely decreased if patient taking Biotin.      Troponin [[id number]]  (Abnormal) Collected:  02/03/20 1810    Specimen:  Blood Updated:  02/03/20 1848     Troponin T 0.095 ng/mL     Narrative:       Troponin T Reference Range:  <= 0.03 ng/mL-   Negative for AMI  >0.03 ng/mL-     Abnormal for myocardial necrosis.  Clinicians would have to utilize clinical acumen, EKG, Troponin and serial changes to determine if it is an Acute Myocardial Infarction or myocardial injury due to an underlying chronic condition.       Results may be falsely decreased if patient taking Biotin.          Hemoglobin A1C   Date Value Ref Range Status   02/04/2020 5.5 3.5 - 5.6 % Final     Results from last 7 days   Lab Units 02/03/20  1022   INR  1.17*           No results found for: LIPASE  Lab Results   Component Value Date    CHOL 74 02/04/2020    TRIG 89 02/04/2020    HDL 28 (L) 02/04/2020    LDL 28 02/04/2020       No results found for: INTRAOP, PREDX, FINALDX, COMDX    Microbiology Results (last 10 days)     Procedure Component Value - Date/Time    Blood Culture - Blood, Hand, Right [[id number]] Collected:  02/03/20 1313    Lab Status:  Preliminary result Specimen:  Blood from Hand, Right Updated:  02/04/20 1330     Blood Culture No growth at 24 hours    Blood Culture - Blood, Arm, Right [[id number]] Collected:  02/03/20 1305    Lab Status:  Preliminary result Specimen:  Blood from Arm, Right Updated:  02/04/20  1330     Blood Culture No growth at 24 hours    Influenza Antigen, Rapid - Swab, Nasopharynx [[id number]]  (Normal) Collected:  02/03/20 1022    Lab Status:  Final result Specimen:  Swab from Nasopharynx Updated:  02/03/20 1056     Influenza A PCR Not Detected     Influenza B PCR Not Detected          ECG/EMG Results (most recent)     Procedure Component Value Units Date/Time    ECG 12 Lead [[id number]] Collected:  02/03/20 0959     Updated:  02/03/20 1751    Narrative:       HEART RATE= 100  bpm  RR Interval= 600  ms  IN Interval= 150  ms  P Horizontal Axis= -10  deg  P Front Axis= 57  deg  QRSD Interval= 74  ms  QT Interval= 341  ms  QRS Axis= -16  deg  T Wave Axis= -17  deg  - ABNORMAL ECG -  Sinus tachycardia  Inferior infarct, old  When compared with ECG of 05-Sep-2018 1:03:40,  Electronically Signed By: Matthew Null (RICA) 03-Feb-2020 17:50:54  Date and Time of Study: 2020-02-03 09:59:01    Adult Transthoracic Echo Complete W/ Cont if Necessary Per Protocol [[id number]] Collected:  02/04/20 0946     Updated:  02/04/20 1138     BSA 2.1 m^2      BH CV ECHO ISAI - RVDD 3.8 cm      IVSd 1.1 cm      LVIDd 4.4 cm      LVIDs 3.1 cm      LVPWd 0.78 cm      IVS/LVPW 1.4     FS 30.3 %      EDV(Teich) 88.5 ml      ESV(Teich) 37.3 ml      EF(Teich) 57.8 %      EDV(cubed) 86.2 ml      ESV(cubed) 29.2 ml      EF(cubed) 66.1 %      LV mass(C)d 136.9 grams      LV mass(C)dI 65.7 grams/m^2      SV(Teich) 51.2 ml      SI(Teich) 24.6 ml/m^2      SV(cubed) 57.0 ml      SI(cubed) 27.4 ml/m^2      Ao root diam 2.4 cm      Ao root area 4.7 cm^2      ACS 1.7 cm      asc Aorta Diam 2.5 cm      LVOT diam 1.9 cm      LVOT area 3.0 cm^2      RVOT diam 2.8 cm      RVOT area 6.1 cm^2      EDV(MOD-sp4) 57.4 ml      ESV(MOD-sp4) 24.0 ml      EF(MOD-sp4) 58.2 %      EDV(MOD-sp2) 47.9 ml      ESV(MOD-sp2) 21.7 ml      EF(MOD-sp2) 54.7 %      SV(MOD-sp4) 33.4 ml      SI(MOD-sp4) 16.0 ml/m^2      SV(MOD-sp2) 26.2 ml      SI(MOD-sp2) 12.6 ml/m^2       Ao root area (BSA corrected) 1.2     LV Rob Vol (BSA corrected) 27.5 ml/m^2      LV Sys Vol (BSA corrected) 11.5 ml/m^2      MV E max diana 80.4 cm/sec      MV A max diana 106.8 cm/sec      MV E/A 0.75     MV V2 max 96.7 cm/sec      MV max PG 3.7 mmHg      MV V2 mean 74.7 cm/sec      MV mean PG 2.3 mmHg      MV V2 VTI 22.4 cm      MVA(VTI) 3.9 cm^2      MV dec slope 309.4 cm/sec^2      MV dec time 0.26 sec      Ao pk diana 237.5 cm/sec      Ao max PG 22.6 mmHg      Ao max PG (full) 11.8 mmHg      Ao V2 mean 186.9 cm/sec      Ao mean PG 14.9 mmHg      Ao mean PG (full) 8.9 mmHg      Ao V2 VTI 39.7 cm      ROSA(I,A) 2.2 cm^2      ROSA(I,D) 2.2 cm^2      ROSA(V,A) 2.0 cm^2      ROSA(V,D) 2.0 cm^2      LV V1 max PG 10.8 mmHg      LV V1 mean PG 6.1 mmHg      LV V1 max 164.4 cm/sec      LV V1 mean 113.7 cm/sec      LV V1 VTI 29.9 cm      SV(Ao) 186.4 ml      SI(Ao) 89.4 ml/m^2      SV(LVOT) 88.4 ml      SV(RVOT) 114.9 ml      SI(LVOT) 42.4 ml/m^2      PA V2 max 134.0 cm/sec      PA max PG 7.2 mmHg      PA max PG (full) 4.5 mmHg      PA V2 mean 99.9 cm/sec      PA mean PG 4.3 mmHg      PA mean PG (full) 2.3 mmHg      PA V2 VTI 24.4 cm      PVA(I,A) 4.7 cm^2      BH CV ECHO ISAI - PVA(I,D) 4.7 cm^2      BH CV ECHO ISAI - PVA(V,A) 3.7 cm^2      BH CV ECHO ISAI - PVA(V,D) 3.7 cm^2      PA acc time 0.17 sec      RV V1 max PG 2.7 mmHg      RV V1 mean PG 2.0 mmHg      RV V1 max 82.4 cm/sec      RV V1 mean 67.2 cm/sec      RV V1 VTI 18.9 cm      TR max diana 335.6 cm/sec      RVSP(TR) 53.2 mmHg      RAP systole 8.0 mmHg      PA pr(Accel) 4.3 mmHg      Qp/Qs 1.3     BH CV ECHO ISAI - BZI_BMI 44.8 kilograms/m^2       CV ECHO ISAI - BSA(HAYCOCK) 2.3 m^2       CV ECHO ISAI - BZI_METRIC_WEIGHT 111.1 kg       CV ECHO ISAI - BZI_METRIC_HEIGHT 157.5 cm      EF(MOD-bp) 55.0 %      LA dimension(2D) 3.1 cm     ECG 12 Lead [[id number]] Collected:  02/04/20 1230     Updated:  02/04/20 1231    Narrative:       HEART RATE= 93  bpm  RR Interval=  644  ms  AL Interval= 159  ms  P Horizontal Axis= -3  deg  P Front Axis= 53  deg  QRSD Interval= 76  ms  QT Interval= 360  ms  QRS Axis= -14  deg  T Wave Axis= -11  deg  - ABNORMAL ECG -  Sinus rhythm  Inferior infarct, old  When compared with ECG of 03-Feb-2020 9:59:01,  No significant change  Electronically Signed By:   Date and Time of Study: 2020-02-04 12:30:40                    Xr Chest 2 View    Result Date: 2/3/2020   1. No acute chest findings. 2. Borderline cardiac enlargement.  Electronically Signed By-Dr. Deann Salinas MD On:2/3/2020 11:01 AM This report was finalized on [id number] by Dr. Deann Salinas MD.    Ct Chest Pulmonary Embolism    Result Date: 2/3/2020  No evidence of pulmonary embolus or thoracic aortic aneurysm or dissection No evidence of adenopathy or mass, no acute/ active cardiopulmonary disease is present. Changes of dish syndrome, upper abdomen is unremarkable with change of cholecystectomy     Electronically Signed By-Gonzalo Grimaldo Jr. On:2/3/2020 12:49 PM This report was finalized on [id number] by  Gonzalo Grimaldo Jr., .          Xrays, labs reviewed personally by physician.    Medication Review:   I have reviewed the patient's current medication list      Scheduled Meds    aspirin 81 mg Oral Daily   busPIRone 10 mg Oral 4x Daily   calcium-vitamin D 1 tablet Oral Daily   enoxaparin 40 mg Subcutaneous Q12H   gabapentin 400 mg Oral TID   levETIRAcetam 500 mg Oral BID   methIMAzole 10 mg Oral BID   montelukast 10 mg Oral Nightly   multivitamin-iron-minerals-folic acid 1 tablet Oral Daily   pantoprazole 40 mg Oral QAM   PARoxetine 20 mg Oral QAM   potassium chloride 20 mEq Oral TID   sodium chloride 10 mL Intravenous Q12H   topiramate 200 mg Oral BID   traZODone 50 mg Oral Nightly       Meds Infusions    nitroglycerin 5-200 mcg/min Last Rate: Stopped (02/03/20 2135)       Meds PRN  •  acetaminophen **OR** acetaminophen **OR** acetaminophen  •  aluminum-magnesium  hydroxide-simethicone  •  bisacodyl  •  magnesium hydroxide  •  magnesium sulfate **OR** magnesium sulfate in D5W 1g/100mL (PREMIX)  •  melatonin  •  nitroglycerin  •  ondansetron **OR** ondansetron  •  potassium chloride  •  [COMPLETED] Insert peripheral IV **AND** sodium chloride  •  sodium chloride      Assessment/Plan   Assessment/Plan     Active Hospital Problems    Diagnosis  POA   • **Shortness of breath [R06.02]  Yes   • Elevated troponin [R79.89]  Yes   • Pain in lower jaw [R68.84]  Yes   • GERD (gastroesophageal reflux disease) [K21.9]  Yes   • Tobacco dependency [F17.200]  Yes   • Normocytic anemia [D64.9]  Yes   • Complex partial seizure (CMS/HCC) [G40.209]  Yes   • Sleep apnea [G47.30]  Yes   • Migraine [G43.909]  Yes   • Morbid obesity (CMS/HCC) [E66.01]  Yes   • Anxiety [F41.9]  Yes      Resolved Hospital Problems   No resolved problems to display.       MEDICAL DECISION MAKING COMPLEXITY BY PROBLEM:     Shortness of breath -progressive over the last 3 weeks.  D-dimer elevated but CT PE protocol negative for thromboembolus.  No signs of pneumonia or COPD, mildly elevated troponins but may be a demand ischemia picture versus NSTEMI  -Nitroglycerin drip  -Troponin slowly trending up  -Cardiology consultation, uncertain if troponins due to hypothyroidism and associated demand ischemia given tachycardia  -Scheduled for stress test  -Echo  -Re-stratification with A1c and lipids  -Influenza negative  -Continue aspirin tomorrow  -Mild elevation in proBNP but patient appears generally euvolemic     Hyperthyroidism-patient with low TSH significantly elevated free T4. May have been contributory to shortness of breath and tachycardia as well as elevated troponins  -Auto antibodies and anti-TPO labs ordered  -endocrinology consulted  -Patient started on methimazole  -Patient will have thyroid uptake scan as an outpatient and follow-up with endocrinology    Jaw pain -referred from cardiac source versus true dental  issue.  Patient unable to afford dentist  -  -Blood culture no growth at 24 hours  -No definitive infection observed  -Pain control     Anemia -appears to be chronic in nature lower than previous hospitalization.  Most likely indicative of chronic disease  -Monitor daily  -anemia w/u     Sleep apnea-compliant with home nocturnal CPAP, continue     Complex partial seizure -no recent episodes  -continue home Keppra, gabapentin, and Topamax  -seizure precautions     Migraine/anxiety/GERD-chronic, stable  -continue home medications     Morbid obesity   -BMI 44.84  -hold orlistat for now  -encourage lifestyle modifications     Tobacco dependency, 60 pack-year history   -patient states she quit 3 weeks ago  -continued cessation encouraged\    VTE Prophylaxis - Lovenox 40 mg SC daily.      Code Status -   Code Status and Medical Interventions:   Ordered at: 02/03/20 1441     Code Status:    CPR     Medical Interventions (Level of Support Prior to Arrest):    Full       Discharge Planning          Destination      Coordination has not been started for this encounter.      Durable Medical Equipment      Coordination has not been started for this encounter.      Dialysis/Infusion      Coordination has not been started for this encounter.      Home Medical Care      Coordination has not been started for this encounter.      Therapy      Coordination has not been started for this encounter.      Community Resources      Coordination has not been started for this encounter.            Electronically signed by Jass Kaplan MD, 02/04/20, 3:36 PM.  [facility]ist Team

## 2020-02-05 ENCOUNTER — APPOINTMENT (OUTPATIENT)
Dept: NUCLEAR MEDICINE | Facility: HOSPITAL | Age: 70
End: 2020-02-05

## 2020-02-05 VITALS
HEART RATE: 101 BPM | OXYGEN SATURATION: 93 % | SYSTOLIC BLOOD PRESSURE: 150 MMHG | BODY MASS INDEX: 45.08 KG/M2 | DIASTOLIC BLOOD PRESSURE: 68 MMHG | HEIGHT: 62 IN | WEIGHT: 245 LBS | RESPIRATION RATE: 18 BRPM | TEMPERATURE: 97.2 F

## 2020-02-05 LAB
ALBUMIN SERPL-MCNC: 3.3 G/DL (ref 3.5–5.2)
ALBUMIN/GLOB SERPL: 1 G/DL
ALP SERPL-CCNC: 63 U/L (ref 39–117)
ALT SERPL W P-5'-P-CCNC: 14 U/L (ref 1–33)
ANION GAP SERPL CALCULATED.3IONS-SCNC: 7 MMOL/L (ref 5–15)
AST SERPL-CCNC: 25 U/L (ref 1–32)
BASOPHILS # BLD AUTO: 0 10*3/MM3 (ref 0–0.2)
BASOPHILS NFR BLD AUTO: 0.4 % (ref 0–1.5)
BH CV NUCLEAR PRIOR STUDY: 3
BH CV STRESS BP STAGE 1: NORMAL
BH CV STRESS BP STAGE 2: NORMAL
BH CV STRESS BP STAGE 3: NORMAL
BH CV STRESS BP STAGE 4: NORMAL
BH CV STRESS COMMENTS STAGE 1: NORMAL
BH CV STRESS COMMENTS STAGE 2: NORMAL
BH CV STRESS DOSE REGADENOSON STAGE 1: 0.4
BH CV STRESS DURATION MIN STAGE 1: 0
BH CV STRESS DURATION MIN STAGE 2: 4
BH CV STRESS DURATION SEC STAGE 1: 10
BH CV STRESS DURATION SEC STAGE 2: 0
BH CV STRESS HR STAGE 1: 105
BH CV STRESS HR STAGE 2: 112
BH CV STRESS HR STAGE 3: 109
BH CV STRESS HR STAGE 4: 109
BH CV STRESS PROTOCOL 1: NORMAL
BH CV STRESS RECOVERY BP: NORMAL MMHG
BH CV STRESS RECOVERY HR: 100 BPM
BH CV STRESS STAGE 1: 1
BH CV STRESS STAGE 2: 2
BH CV STRESS STAGE 3: 3
BH CV STRESS STAGE 4: 4
BILIRUB SERPL-MCNC: 0.3 MG/DL (ref 0.2–1.2)
BUN BLD-MCNC: 23 MG/DL (ref 8–23)
BUN/CREAT SERPL: 26.7 (ref 7–25)
CALCIUM SPEC-SCNC: 10.3 MG/DL (ref 8.6–10.5)
CHLORIDE SERPL-SCNC: 111 MMOL/L (ref 98–107)
CO2 SERPL-SCNC: 25 MMOL/L (ref 22–29)
CREAT BLD-MCNC: 0.86 MG/DL (ref 0.57–1)
DEPRECATED RDW RBC AUTO: 40.7 FL (ref 37–54)
EOSINOPHIL # BLD AUTO: 0.1 10*3/MM3 (ref 0–0.4)
EOSINOPHIL NFR BLD AUTO: 2.8 % (ref 0.3–6.2)
ERYTHROCYTE [DISTWIDTH] IN BLOOD BY AUTOMATED COUNT: 12.9 % (ref 12.3–15.4)
GFR SERPL CREATININE-BSD FRML MDRD: 80 ML/MIN/1.73
GLOBULIN UR ELPH-MCNC: 3.4 GM/DL
GLUCOSE BLD-MCNC: 108 MG/DL (ref 65–99)
HCT VFR BLD AUTO: 29.7 % (ref 34–46.6)
HGB BLD-MCNC: 10 G/DL (ref 12–15.9)
LV EF NUC BP: 58 %
LYMPHOCYTES # BLD AUTO: 1.7 10*3/MM3 (ref 0.7–3.1)
LYMPHOCYTES NFR BLD AUTO: 40 % (ref 19.6–45.3)
MAGNESIUM SERPL-MCNC: 2.3 MG/DL (ref 1.6–2.4)
MAXIMAL PREDICTED HEART RATE: 152 BPM
MCH RBC QN AUTO: 29.6 PG (ref 26.6–33)
MCHC RBC AUTO-ENTMCNC: 33.5 G/DL (ref 31.5–35.7)
MCV RBC AUTO: 88.5 FL (ref 79–97)
MONOCYTES # BLD AUTO: 0.4 10*3/MM3 (ref 0.1–0.9)
MONOCYTES NFR BLD AUTO: 10.6 % (ref 5–12)
NEUTROPHILS # BLD AUTO: 1.9 10*3/MM3 (ref 1.7–7)
NEUTROPHILS NFR BLD AUTO: 46.2 % (ref 42.7–76)
NRBC BLD AUTO-RTO: 0.1 /100 WBC (ref 0–0.2)
PERCENT MAX PREDICTED HR: 73.68 %
PLATELET # BLD AUTO: 249 10*3/MM3 (ref 140–450)
PMV BLD AUTO: 7.4 FL (ref 6–12)
POTASSIUM BLD-SCNC: 4.4 MMOL/L (ref 3.5–5.2)
PROT SERPL-MCNC: 6.7 G/DL (ref 6–8.5)
RBC # BLD AUTO: 3.36 10*6/MM3 (ref 3.77–5.28)
SODIUM BLD-SCNC: 143 MMOL/L (ref 136–145)
STRESS BASELINE BP: NORMAL MMHG
STRESS BASELINE HR: 95 BPM
STRESS PERCENT HR: 87 %
STRESS POST PEAK BP: NORMAL MMHG
STRESS POST PEAK HR: 112 BPM
STRESS TARGET HR: 129 BPM
THYROPEROXIDASE AB SERPL-ACNC: 64 IU/ML (ref 0–34)
TROPONIN T SERPL-MCNC: 0.08 NG/ML (ref 0–0.03)
WBC NRBC COR # BLD: 4.1 10*3/MM3 (ref 3.4–10.8)

## 2020-02-05 PROCEDURE — 93016 CV STRESS TEST SUPVJ ONLY: CPT | Performed by: INTERNAL MEDICINE

## 2020-02-05 PROCEDURE — G0378 HOSPITAL OBSERVATION PER HR: HCPCS

## 2020-02-05 PROCEDURE — 93018 CV STRESS TEST I&R ONLY: CPT | Performed by: INTERNAL MEDICINE

## 2020-02-05 PROCEDURE — 85025 COMPLETE CBC W/AUTO DIFF WBC: CPT | Performed by: FAMILY MEDICINE

## 2020-02-05 PROCEDURE — 80053 COMPREHEN METABOLIC PANEL: CPT | Performed by: INTERNAL MEDICINE

## 2020-02-05 PROCEDURE — 78452 HT MUSCLE IMAGE SPECT MULT: CPT

## 2020-02-05 PROCEDURE — 84484 ASSAY OF TROPONIN QUANT: CPT | Performed by: INTERNAL MEDICINE

## 2020-02-05 PROCEDURE — 78452 HT MUSCLE IMAGE SPECT MULT: CPT | Performed by: INTERNAL MEDICINE

## 2020-02-05 PROCEDURE — 99217 PR OBSERVATION CARE DISCHARGE MANAGEMENT: CPT | Performed by: FAMILY MEDICINE

## 2020-02-05 PROCEDURE — 99213 OFFICE O/P EST LOW 20 MIN: CPT | Performed by: INTERNAL MEDICINE

## 2020-02-05 PROCEDURE — 25010000002 REGADENOSON 0.4 MG/5ML SOLUTION: Performed by: FAMILY MEDICINE

## 2020-02-05 PROCEDURE — 96372 THER/PROPH/DIAG INJ SC/IM: CPT

## 2020-02-05 PROCEDURE — 0 TECHNETIUM SESTAMIBI: Performed by: FAMILY MEDICINE

## 2020-02-05 PROCEDURE — A9500 TC99M SESTAMIBI: HCPCS | Performed by: FAMILY MEDICINE

## 2020-02-05 PROCEDURE — 93017 CV STRESS TEST TRACING ONLY: CPT

## 2020-02-05 PROCEDURE — 83735 ASSAY OF MAGNESIUM: CPT | Performed by: NURSE PRACTITIONER

## 2020-02-05 PROCEDURE — 25010000002 ENOXAPARIN PER 10 MG: Performed by: NURSE PRACTITIONER

## 2020-02-05 RX ORDER — METOPROLOL SUCCINATE 25 MG/1
25 TABLET, EXTENDED RELEASE ORAL
Status: DISCONTINUED | OUTPATIENT
Start: 2020-02-05 | End: 2020-02-05 | Stop reason: HOSPADM

## 2020-02-05 RX ORDER — METHIMAZOLE 10 MG/1
10 TABLET ORAL 2 TIMES DAILY
Qty: 60 TABLET | Refills: 0 | Status: SHIPPED | OUTPATIENT
Start: 2020-02-05 | End: 2020-03-06

## 2020-02-05 RX ORDER — METOPROLOL SUCCINATE 25 MG/1
25 TABLET, EXTENDED RELEASE ORAL
Qty: 30 TABLET | Refills: 0 | Status: SHIPPED | OUTPATIENT
Start: 2020-02-05 | End: 2021-03-30

## 2020-02-05 RX ADMIN — BUSPIRONE HYDROCHLORIDE 10 MG: 10 TABLET ORAL at 13:20

## 2020-02-05 RX ADMIN — PAROXETINE HYDROCHLORIDE 20 MG: 20 TABLET, FILM COATED ORAL at 09:16

## 2020-02-05 RX ADMIN — METHIMAZOLE 10 MG: 5 TABLET ORAL at 09:16

## 2020-02-05 RX ADMIN — Medication 1 TABLET: at 09:18

## 2020-02-05 RX ADMIN — GABAPENTIN 400 MG: 400 CAPSULE ORAL at 09:16

## 2020-02-05 RX ADMIN — BUSPIRONE HYDROCHLORIDE 10 MG: 10 TABLET ORAL at 17:40

## 2020-02-05 RX ADMIN — GABAPENTIN 400 MG: 400 CAPSULE ORAL at 16:05

## 2020-02-05 RX ADMIN — BUSPIRONE HYDROCHLORIDE 10 MG: 10 TABLET ORAL at 09:16

## 2020-02-05 RX ADMIN — LEVETIRACETAM 500 MG: 500 TABLET ORAL at 09:16

## 2020-02-05 RX ADMIN — POTASSIUM CHLORIDE 20 MEQ: 1500 TABLET, EXTENDED RELEASE ORAL at 16:04

## 2020-02-05 RX ADMIN — ASPIRIN 81 MG: 81 TABLET, DELAYED RELEASE ORAL at 09:15

## 2020-02-05 RX ADMIN — TOPIRAMATE 200 MG: 100 TABLET, FILM COATED ORAL at 09:15

## 2020-02-05 RX ADMIN — ENOXAPARIN SODIUM 40 MG: 40 INJECTION SUBCUTANEOUS at 09:17

## 2020-02-05 RX ADMIN — PANTOPRAZOLE SODIUM 40 MG: 40 TABLET, DELAYED RELEASE ORAL at 09:17

## 2020-02-05 RX ADMIN — OYSTER SHELL CALCIUM WITH VITAMIN D 1 TABLET: 500; 200 TABLET, FILM COATED ORAL at 09:17

## 2020-02-05 RX ADMIN — POTASSIUM CHLORIDE 20 MEQ: 1500 TABLET, EXTENDED RELEASE ORAL at 09:16

## 2020-02-05 RX ADMIN — Medication 10 ML: at 09:21

## 2020-02-05 RX ADMIN — TECHNETIUM TC 99M SESTAMIBI 1 DOSE: 1 INJECTION INTRAVENOUS at 10:10

## 2020-02-05 RX ADMIN — TECHNETIUM TC 99M SESTAMIBI 1 DOSE: 1 INJECTION INTRAVENOUS at 12:15

## 2020-02-05 RX ADMIN — REGADENOSON 0.4 MG: 0.08 INJECTION, SOLUTION INTRAVENOUS at 12:15

## 2020-02-05 NOTE — PROGRESS NOTES
CARDIOLOGY FOLLOW-UP PROGRESS NOTE      Reason for follow-up:      SOA  Jaw pain  ALEX     Attending: Jass Kaplan,*      Subjective .     No chest pain or dyspnea overnight     Review of Systems   Constitution: Negative for decreased appetite and diaphoresis.   HENT: Negative for congestion, hearing loss and nosebleeds.    Cardiovascular: Positive for dyspnea on exertion. Negative for chest pain, claudication, irregular heartbeat, leg swelling, near-syncope, orthopnea, palpitations, paroxysmal nocturnal dyspnea and syncope.   Respiratory: Positive for shortness of breath. Negative for cough and sleep disturbances due to breathing.    Endocrine: Negative for polyuria.   Hematologic/Lymphatic: Does not bruise/bleed easily.   Skin: Negative for itching and rash.   Musculoskeletal: Negative for back pain, muscle weakness and myalgias.   Gastrointestinal: Negative for abdominal pain, change in bowel habit and nausea.   Genitourinary: Negative for dysuria, flank pain, frequency and hesitancy.   Neurological: Negative for dizziness, tremors and weakness.   Psychiatric/Behavioral: Negative for altered mental status. The patient does not have insomnia.        Allergies: Penicillins    Scheduled Meds:    aspirin 81 mg Oral Daily   busPIRone 10 mg Oral 4x Daily   calcium-vitamin D 1 tablet Oral Daily   enoxaparin 40 mg Subcutaneous Q12H   gabapentin 400 mg Oral TID   levETIRAcetam 500 mg Oral BID   methIMAzole 10 mg Oral BID   metoprolol succinate XL 25 mg Oral Q24H   montelukast 10 mg Oral Nightly   multivitamin-iron-minerals-folic acid 1 tablet Oral Daily   pantoprazole 40 mg Oral QAM   PARoxetine 20 mg Oral QAM   potassium chloride 20 mEq Oral TID   sodium chloride 10 mL Intravenous Q12H   topiramate 200 mg Oral BID   traZODone 50 mg Oral Nightly       Continuous Infusions:    nitroglycerin 5-200 mcg/min Last Rate: Stopped (02/03/20 2135)       PRN Meds:.•  acetaminophen **OR** acetaminophen **OR**  "acetaminophen  •  aluminum-magnesium hydroxide-simethicone  •  bisacodyl  •  magnesium hydroxide  •  magnesium sulfate **OR** magnesium sulfate in D5W 1g/100mL (PREMIX)  •  melatonin  •  nitroglycerin  •  ondansetron **OR** ondansetron  •  potassium chloride  •  [COMPLETED] Insert peripheral IV **AND** sodium chloride  •  sodium chloride    Objective     VITAL SIGNS  Patient Vitals for the past 24 hrs:   BP Temp Temp src Pulse Resp SpO2   02/05/20 1430 150/68 -- -- 101 -- 93 %   02/05/20 1429 150/68 97.2 °F (36.2 °C) Oral 99 18 96 %   02/05/20 1050 110/71 97.8 °F (36.6 °C) Oral 88 16 91 %   02/05/20 0613 125/61 98.2 °F (36.8 °C) Oral 93 12 94 %   02/05/20 0300 132/63 97.9 °F (36.6 °C) Oral 79 14 96 %   02/04/20 2300 136/69 97.5 °F (36.4 °C) Oral 84 12 97 %        Flowsheet Rows      First Filed Value   Admission Height  157.5 cm (62\") Documented at 02/03/2020 0936   Admission Weight  111 kg (245 lb 2.4 oz) Documented at 02/03/2020 0936            Intake/Output Summary (Last 24 hours) at 2/5/2020 1808  Last data filed at 2/5/2020 1300  Gross per 24 hour   Intake 120 ml   Output 600 ml   Net -480 ml        TELEMETRY:     SR    Physical Exam:  Physical Exam   Constitutional: She is oriented to person, place, and time. She appears well-developed and well-nourished. No distress.   HENT:   Head: Normocephalic and atraumatic.   Eyes: Pupils are equal, round, and reactive to light. Right eye exhibits no discharge. No scleral icterus.   Neck: Normal range of motion. Neck supple. No JVD present. No thyromegaly present.   Cardiovascular: Normal rate, regular rhythm, S1 normal, S2 normal, normal heart sounds and intact distal pulses. Exam reveals no gallop and no friction rub.   No murmur heard.  Pulmonary/Chest: Effort normal and breath sounds normal. No respiratory distress. She has no wheezes. She has no rales.   Abdominal: Soft. Normal appearance. She exhibits no distension. There is no tenderness.   Musculoskeletal: Normal " range of motion. She exhibits no edema.   Lymphadenopathy:     She has no cervical adenopathy.   Neurological: She is alert and oriented to person, place, and time.   Skin: Skin is warm and dry. No rash noted. No erythema.   Psychiatric: She has a normal mood and affect.          Results Review:   I reviewed the patient's new clinical results.    CBC    Results from last 7 days   Lab Units 02/05/20  0311 02/04/20  0400 02/03/20  1022   WBC 10*3/mm3 4.10 3.90 4.60   HEMOGLOBIN g/dL 10.0* 9.8* 10.5*   PLATELETS 10*3/mm3 249 260 263     BMP   Results from last 7 days   Lab Units 02/05/20  0311 02/04/20  0400 02/03/20  1022   SODIUM mmol/L 143 141 143   POTASSIUM mmol/L 4.4 4.0 4.6   CHLORIDE mmol/L 111* 107 107   CO2 mmol/L 25.0 24.0 22.0   BUN mg/dL 23 20 22   CREATININE mg/dL 0.86 0.84 0.81   GLUCOSE mg/dL 108* 114* 101*   MAGNESIUM mg/dL 2.3 1.8  --      Cr Clearance Estimated Creatinine Clearance: 73.6 mL/min (by C-G formula based on SCr of 0.86 mg/dL).  Coag   Results from last 7 days   Lab Units 02/03/20  1022   INR  1.17*   APTT seconds 23.3*     HbA1C   Lab Results   Component Value Date    HGBA1C 5.5 02/04/2020     Blood Glucose No results found for: POCGLU  Infection   Results from last 7 days   Lab Units 02/03/20  1313 02/03/20  1305   BLOODCX  No growth at 2 days No growth at 2 days     CMP   Results from last 7 days   Lab Units 02/05/20  0311 02/04/20  0400 02/03/20  1022   SODIUM mmol/L 143 141 143   POTASSIUM mmol/L 4.4 4.0 4.6   CHLORIDE mmol/L 111* 107 107   CO2 mmol/L 25.0 24.0 22.0   BUN mg/dL 23 20 22   CREATININE mg/dL 0.86 0.84 0.81   GLUCOSE mg/dL 108* 114* 101*   ALBUMIN g/dL 3.30*  --   --    BILIRUBIN mg/dL 0.3  --   --    ALK PHOS U/L 63  --   --    AST (SGOT) U/L 25  --   --    ALT (SGPT) U/L 14  --   --      ABG      UA      BURAK  No results found for: POCMETH, POCAMPHET, POCBARBITUR, POCBENZO, POCCOCAINE, POCOPIATES, POCOXYCODO, POCPHENCYC, POCPROPOXY, POCTHC, POCTRICYC  Lysis Labs    Results from last 7 days   Lab Units 02/05/20  0311 02/04/20  0400 02/03/20  1022   INR   --   --  1.17*   APTT seconds  --   --  23.3*   HEMOGLOBIN g/dL 10.0* 9.8* 10.5*   PLATELETS 10*3/mm3 249 260 263   CREATININE mg/dL 0.86 0.84 0.81     Radiology(recent) No radiology results for the last day    Imaging Results (Last 24 Hours)     ** No results found for the last 24 hours. **          Results from last 7 days   Lab Units 02/05/20  0311   TROPONIN T ng/mL 0.078*       EKG               I personally viewed and interpreted the patient's EKG/Telemetry data:        ECHOCARDIOGRAM:     Results for orders placed during the hospital encounter of 02/03/20   Adult Transthoracic Echo Complete W/ Cont if Necessary Per Protocol    Narrative · Left ventricular systolic function is normal.  · Estimated EF = 55%.  · Left ventricular diastolic dysfunction (grade I) consistent with   impaired relaxation.  · Mild tricuspid valve regurgitation is present.  · Calculated right ventricular systolic pressure from tricuspid   regurgitation is 53.2 mmHg.            STRESS MYOVIEW:      CARDIAC CATHETERIZATION:      OTHER:         Assessment/Plan            Shortness of breath    Sleep apnea    Complex partial seizure (CMS/HCC)    Migraine    Anxiety    Morbid obesity (CMS/HCC)    Elevated troponin    Pain in lower jaw    GERD (gastroesophageal reflux disease)    Tobacco dependency    Normocytic anemia        ASSESSMENT:     SOA  Jaw  Pain  Indeterminate troponin elevation.   HTN  ALEX      PLAN:    Stress myoview for risk stratification  Dr. Richardson to review.         Additional recommendations per Dr. Richardson    I discussed the patients findings and my recommendations with patient and RN      Patient is seen and examined and findings are verified.  Patient denies any symptom of chest pain or tightness or heaviness.  Patient does not have shortness of breath.    Hemodynamics are stable.    Normal S1 and S2.  No pericardial rub or  murmur.    Echocardiogram showed EF of 55% and no significant wall motion abnormality noted.  Stress test showed no myocardial ischemia.    At this stage I will add Toprol-XL 25 mg daily.  Blood pressure monitoring is recommended.  If patient has more symptoms in future I would proceed with right and left heart catheterization.  Patient can be discharged and follow-up with me in 2 to 4 weeks          Alberto Richardson MD  02/05/20  6:08 PM

## 2020-02-05 NOTE — PLAN OF CARE
Patient remains off nitro gtt. No complaints of chest pain. Plans to have a stress test today.   Problem: Patient Care Overview  Goal: Plan of Care Review  Outcome: Ongoing (interventions implemented as appropriate)  Flowsheets (Taken 2/5/2020 0129)  Progress: improving  Plan of Care Reviewed With: patient  Goal: Individualization and Mutuality  Outcome: Ongoing (interventions implemented as appropriate)  Goal: Discharge Needs Assessment  Outcome: Ongoing (interventions implemented as appropriate)  Goal: Interprofessional Rounds/Family Conf  Outcome: Ongoing (interventions implemented as appropriate)     Problem: Fall Risk (Adult)  Goal: Identify Related Risk Factors and Signs and Symptoms  Outcome: Ongoing (interventions implemented as appropriate)  Goal: Absence of Fall  Outcome: Ongoing (interventions implemented as appropriate)  Flowsheets (Taken 2/5/2020 0129)  Absence of Fall: making progress toward outcome     Problem: Skin Injury Risk (Adult)  Goal: Identify Related Risk Factors and Signs and Symptoms  Outcome: Ongoing (interventions implemented as appropriate)  Goal: Skin Health and Integrity  Outcome: Ongoing (interventions implemented as appropriate)  Flowsheets (Taken 2/5/2020 0129)  Skin Health and Integrity: making progress toward outcome

## 2020-02-05 NOTE — DISCHARGE SUMMARY
Date of Admission: 2/3/2020    Date of Discharge:  2/5/2020    Length of stay:  LOS: 0 days     Discharge Diagnosis:     Symptomatic hypothyroidism    Presenting Problem List:    Shortness of breath -progressive over the last 3 weeks.  D-dimer elevated but CT PE protocol negative for thromboembolus.  No signs of pneumonia or COPD, mildly elevated troponins but most likely demand ischemia due to tachycardia associated with hypothyroidism  -Off nitroglycerin  -Troponin trending down  -Cardiology consultation, stress test low likelihood of ischemia  -Echo  -Re-stratification with A1c and lipids  -Influenza negative  -Continue aspirin tomorrow  -Mild elevation in proBNP but patient appears generally euvolemic     Hyperthyroidism-patient with low TSH significantly elevated free T4. May have been contributory to shortness of breath and tachycardia as well as elevated troponins  -Auto antibodies and anti-TPO labs ordered  -endocrinology consulted  -Patient started on methimazole  -Patient will have thyroid uptake scan as an outpatient and follow-up with endocrinology     Jaw pain -referred from cardiac source versus true dental issue.  Patient unable to afford dentist  -  -Blood culture no growth at 24 hours  -No definitive infection observed  -Pain control     Anemia -appears to be chronic in nature lower than previous hospitalization.  Most likely indicative of chronic disease  -Monitor daily  -anemia w/u     Sleep apnea-compliant with home nocturnal CPAP, continue     Complex partial seizure -no recent episodes  -continue home Keppra, gabapentin, and Topamax  -seizure precautions     Migraine/anxiety/GERD-chronic, stable  -continue home medications     Morbid obesity   -BMI 44.84  -hold orlistat for now  -encourage lifestyle modifications     Tobacco dependency, 60 pack-year history   -patient states she quit 3 weeks ago  -continued cessation encouraged\    HPI/Hospital Course:  Ms. Marie is a 69  y.o. morbidly obese  female with a history of sleep apnea, anxiety, complex partial seizure, migraines, and GERD who presents to [facility] ED on 02/03/2020 complaining of shortness of breath. The patient states her symptoms started approximately 3 weeks ago and have gotten progressively worse. She denies any chest pain. She states the shortness of breath is intermittent and worse with exertion. She denies any alleviating factors. She denies a history of coronary artery disease, HLD, or DMII. She reports a remote history of HTN, but no longer takes antihypertensives. She is a heavy smoker with a 60 pack year history. She reports a family history of heart disease. She denies any previous cardiac workup.      The patient is also complaining of left jaw pain. She reports a recent left lower molar tooth infection with foul odor and moderate to severe left jaw pain. She states she cannot afford to go to the dentist. She denies any recent antibiotic use, but states she has been swishing with Peroxide at home.      Upon arrival to the ER the patient was given aspirin 324 mg PO. Her troponin is elevated at 0.118. Her dimer is also elevated at 1.57. Her EKG shows sinus tachycardia with old infarct. Her CT chest was negative for PE. Her CXR showed no acute findings. Her labs are significant for hgb 10.5, CRP 0.67, and ESR 60. Blood cultures were obtained. She was started on Tridil drip. She was also given normal saline 500 cc. She will be admitted for observation and further evaluation.     2/4/20:  Patient reports breathing much improved but still feeling generally fatigued.  Patient found with a very low TSH and very high free T4 no history of hypothyroidism or goiter.  Patient also had troponins still elevated but denies any chest pain.  No nausea or vomiting no difficulty swallowing or voice changes.  2/5/20: Patient underwent stress test low risk for ischemia.  Patient's overall symptoms of  fatigue and tiredness improving.  Patient cleared for discharge by cardiology and endocrinology.  Patient will continue methimazole 10 mg twice a day and follow-up with endocrinology in 4 to 6 weeks for thyroid uptake scan.  Patient will follow-up with her cardiologist Dr. Richardson in 2 to 4 weeks for continued evaluation of her heart.  Patient also started on metoprolol.  Patient discharged home in good condition with return precautions given.      Procedures Performed         Consults:   Consults     Date and Time Order Name Status Description    2/4/2020 0832 Inpatient Endocrinology Consult      2/4/2020 0830 Inpatient Cardiology Consult Completed     2/3/2020 1340 Hospitalist (on-call MD unless specified) Completed           Pertinent Test Results:     Lab Results (last 24 hours)     Procedure Component Value Units Date/Time    Blood Culture - Blood, Arm, Right [[id number]] Collected:  02/03/20 1305    Specimen:  Blood from Arm, Right Updated:  02/05/20 1331     Blood Culture No growth at 2 days    Blood Culture - Blood, Hand, Right [[id number]] Collected:  02/03/20 1313    Specimen:  Blood from Hand, Right Updated:  02/05/20 1331     Blood Culture No growth at 2 days    Thyroid Peroxidase Antibody [[id number]]  (Abnormal) Collected:  02/04/20 0849    Specimen:  Blood Updated:  02/05/20 0508     Thyroid Peroxidase Antibody 64 IU/mL     Narrative:       Performed at:  [address] [place] [address]  [id number]  : Jose G Kendrick PhD, Phone:  [phone]    Troponin [[id number]]  (Abnormal) Collected:  02/05/20 0311    Specimen:  Blood Updated:  02/05/20 0420     Troponin T 0.078 ng/mL     Narrative:       Troponin T Reference Range:  <= 0.03 ng/mL-   Negative for AMI  >0.03 ng/mL-     Abnormal for myocardial necrosis.  Clinicians would have to utilize clinical acumen, EKG, Troponin and serial changes to determine if it is an Acute Myocardial Infarction or myocardial injury due to an  underlying chronic condition.       Results may be falsely decreased if patient taking Biotin.      Magnesium [[id number]]  (Normal) Collected:  02/05/20 0311    Specimen:  Blood Updated:  02/05/20 0419     Magnesium 2.3 mg/dL     Comprehensive Metabolic Panel [[id number]]  (Abnormal) Collected:  02/05/20 0311    Specimen:  Blood Updated:  02/05/20 0417     Glucose 108 mg/dL      BUN 23 mg/dL      Creatinine 0.86 mg/dL      Sodium 143 mmol/L      Potassium 4.4 mmol/L      Chloride 111 mmol/L      CO2 25.0 mmol/L      Calcium 10.3 mg/dL      Total Protein 6.7 g/dL      Albumin 3.30 g/dL      ALT (SGPT) 14 U/L      AST (SGOT) 25 U/L      Alkaline Phosphatase 63 U/L      Total Bilirubin 0.3 mg/dL      eGFR  African Amer 80 mL/min/1.73      Globulin 3.4 gm/dL      A/G Ratio 1.0 g/dL      BUN/Creatinine Ratio 26.7     Anion Gap 7.0 mmol/L     Narrative:       GFR Normal >60  Chronic Kidney Disease <60  Kidney Failure <15      CBC & Differential [[id number]] Collected:  02/05/20 0311    Specimen:  Blood Updated:  02/05/20 0346    Narrative:       The following orders were created for panel order CBC & Differential.  Procedure                               Abnormality         Status                     ---------                               -----------         ------                     CBC Auto Differential[[id number]]        Abnormal            Final result                 Please view results for these tests on the individual orders.    CBC Auto Differential [[id number]]  (Abnormal) Collected:  02/05/20 0311    Specimen:  Blood Updated:  02/05/20 0346     WBC 4.10 10*3/mm3      RBC 3.36 10*6/mm3      Hemoglobin 10.0 g/dL      Hematocrit 29.7 %      MCV 88.5 fL      MCH 29.6 pg      MCHC 33.5 g/dL      RDW 12.9 %      RDW-SD 40.7 fl      MPV 7.4 fL      Platelets 249 10*3/mm3      Neutrophil % 46.2 %      Lymphocyte % 40.0 %      Monocyte % 10.6 %      Eosinophil % 2.8 %      Basophil % 0.4 %      Neutrophils, Absolute 1.90  10*3/mm3      Lymphocytes, Absolute 1.70 10*3/mm3      Monocytes, Absolute 0.40 10*3/mm3      Eosinophils, Absolute 0.10 10*3/mm3      Basophils, Absolute 0.00 10*3/mm3      nRBC 0.1 /100 WBC     Troponin [[id number]]  (Abnormal) Collected:  02/04/20 1952    Specimen:  Blood Updated:  02/04/20 2146     Troponin T 0.094 ng/mL     Narrative:       Troponin T Reference Range:  <= 0.03 ng/mL-   Negative for AMI  >0.03 ng/mL-     Abnormal for myocardial necrosis.  Clinicians would have to utilize clinical acumen, EKG, Troponin and serial changes to determine if it is an Acute Myocardial Infarction or myocardial injury due to an underlying chronic condition.       Results may be falsely decreased if patient taking Biotin.            Microbiology Results Abnormal     Procedure Component Value - Date/Time    Blood Culture - Blood, Arm, Right [[id number]] Collected:  02/03/20 1305    Lab Status:  Preliminary result Specimen:  Blood from Arm, Right Updated:  02/05/20 1331     Blood Culture No growth at 2 days    Blood Culture - Blood, Hand, Right [[id number]] Collected:  02/03/20 1313    Lab Status:  Preliminary result Specimen:  Blood from Hand, Right Updated:  02/05/20 1331     Blood Culture No growth at 2 days    Influenza Antigen, Rapid - Swab, Nasopharynx [[id number]]  (Normal) Collected:  02/03/20 1022    Lab Status:  Final result Specimen:  Swab from Nasopharynx Updated:  02/03/20 1056     Influenza A PCR Not Detected     Influenza B PCR Not Detected        No radiology results for the last day    Results for orders placed during the hospital encounter of 02/03/20   Adult Transthoracic Echo Complete W/ Cont if Necessary Per Protocol    Narrative · Left ventricular systolic function is normal.  · Estimated EF = 55%.  · Left ventricular diastolic dysfunction (grade I) consistent with   impaired relaxation.  · Mild tricuspid valve regurgitation is present.  · Calculated right ventricular systolic pressure from tricuspid    regurgitation is 53.2 mmHg.            Condition on Discharge:  Good    Vital Signs  Temp:  [97.2 °F (36.2 °C)-98.2 °F (36.8 °C)] 97.2 °F (36.2 °C)  Heart Rate:  [] 101  Resp:  [12-18] 18  BP: (110-150)/(61-71) 150/68    Physical Exam:  General: Morbidly obese female lying in bed breathing comfortably on room air no acute distress  HEENT: NC/AT, EOMI, mucosa moist, unable to palpate thyroid nodules or masses  Heart: Sinus tachycardia  Chest: CTAB, no w/r/r, normal respiratory effort  Abdominal: Soft. NT/ND. Bowel sounds present  Musculoskeletal: Normal ROM.  Trace pitting edema bilaterally.  No calf tenderness.  Neurological: AAOx3, no focal deficits  Skin: Skin is warm and dry. No rash  Psychiatric: Normal mood and affect.    Discharge Disposition  Home or Self Care [1]    Discharge Medications     Discharge Medications      New Medications      Instructions Start Date   methIMAzole 10 MG tablet  Commonly known as:  TAPAZOLE   10 mg, Oral, 2 Times Daily      metoprolol succinate XL 25 MG 24 hr tablet  Commonly known as:  TOPROL-XL   25 mg, Oral, Every 24 Hours Scheduled         Continue These Medications      Instructions Start Date   ADULT ASPIRIN EC LOW STRENGTH 81 MG EC tablet  Generic drug:  aspirin       busPIRone 10 MG tablet  Commonly known as:  BUSPAR   10 mg, Oral, 4 Times Daily      Calcium Carb-Cholecalciferol 1000-800 MG-UNIT tablet   CALTRATE 600+D3 TABS      CENTRUM ADULTS PO   1 tablet, Oral, Daily      gabapentin 400 MG capsule  Commonly known as:  NEURONTIN   400 mg, Oral, 3 Times Daily      levETIRAcetam 500 MG tablet  Commonly known as:  KEPPRA   500 mg, Oral, 2 Times Daily      montelukast 10 MG tablet  Commonly known as:  SINGULAIR   10 mg, Oral, Nightly      omeprazole 40 MG capsule  Commonly known as:  priLOSEC   TK ONE C PO  ONCE D      PARoxetine 20 MG tablet  Commonly known as:  PAXIL   20 mg, Oral, Every Morning      potassium chloride 20 MEQ CR tablet  Commonly known as:   K-DUR,KLOR-CON   20 mEq, Oral, 3 Times Daily      topiramate 200 MG tablet  Commonly known as:  TOPAMAX   200 mg, Oral, 2 Times Daily      traZODone 50 MG tablet  Commonly known as:  DESYREL   TAKE 1 TABLET BY MOUTH DAILY AT BEDTIME      XENICAL 120 MG capsule  Generic drug:  orlistat   120 mg, Oral, Daily             Discharge Diet:       Activity at Discharge:       Follow-up Appointments  Future Appointments   Date Time Provider Department Center   2/18/2020  3:00 PM Anderson Stephens MD [facility]   11/10/2020  4:00 PM Seipel, Joseph F, MD [facility] None     Additional Instructions for the Follow-ups that You Need to Schedule     Discharge Follow-up with PCP   As directed       Currently Documented PCP:    Ugo Gil MD    PCP Phone Number:    [phone]     Follow Up Details:  Please follow-up with your primary care doctor within a week to make sure your heart rate is slowing down         Discharge Follow-up with PCP   As directed       Currently Documented PCP:    Ugo Gil MD    PCP Phone Number:    [phone]     Follow Up Details:  Please follow-up with your primary care doctor within a week to check your heart rate         Discharge Follow-up with Specified Provider: Please follow-up with Dr. Richardson in 2 to 4 weeks for reevaluation of your heart   As directed      To:  Please follow-up with Dr. Richardson in 2 to 4 weeks for reevaluation of your heart         Discharge Follow-up with Specified Provider: Please follow-up with your endocrinologist in 4 to 6 weeks for further evaluation of your thyroid   As directed      To:  Please follow-up with your endocrinologist in 4 to 6 weeks for further evaluation of your thyroid               Test Results Pending at Discharge   Order Current Status    Thyrotropin Receptor Antibody In process    Blood Culture - Blood, Arm, Right Preliminary result    Blood Culture - Blood, Hand, Right Preliminary result           Risk for Readmission (LACE)  Score: 2 (2/5/2020  6:00 AM)        Jass Kaplan MD  02/05/20  6:14 PM      Time: Discharge 25 min total time spent with face-to-face history/exam, writing all prescriptions, preparing medication reconciliation, and documenting discharge data including chart review of the full admission, care co-ordination with patient, family, , and , etc., and follow-up appointments with PCP and specialists as recommended.

## 2020-02-06 LAB — TSH RECEP AB SER-ACNC: 15.4 IU/L (ref 0–1.75)

## 2020-02-08 LAB
BACTERIA SPEC AEROBE CULT: NORMAL
BACTERIA SPEC AEROBE CULT: NORMAL

## 2020-02-20 PROCEDURE — 93010 ELECTROCARDIOGRAM REPORT: CPT | Performed by: INTERNAL MEDICINE

## 2020-03-09 ENCOUNTER — OFFICE VISIT (OUTPATIENT)
Dept: CARDIOLOGY | Facility: CLINIC | Age: 70
End: 2020-03-09

## 2020-03-09 VITALS
HEIGHT: 62 IN | WEIGHT: 243 LBS | SYSTOLIC BLOOD PRESSURE: 159 MMHG | BODY MASS INDEX: 44.72 KG/M2 | HEART RATE: 60 BPM | DIASTOLIC BLOOD PRESSURE: 88 MMHG | OXYGEN SATURATION: 93 %

## 2020-03-09 DIAGNOSIS — R06.02 SHORTNESS OF BREATH: Primary | ICD-10-CM

## 2020-03-09 DIAGNOSIS — G47.30 SLEEP APNEA, UNSPECIFIED TYPE: Chronic | ICD-10-CM

## 2020-03-09 DIAGNOSIS — F17.200 TOBACCO DEPENDENCY: Chronic | ICD-10-CM

## 2020-03-09 DIAGNOSIS — R07.89 CHEST PRESSURE: ICD-10-CM

## 2020-03-09 PROCEDURE — 99214 OFFICE O/P EST MOD 30 MIN: CPT | Performed by: INTERNAL MEDICINE

## 2020-03-09 NOTE — PROGRESS NOTES
Encounter Date:03/09/2020  New patient      Patient ID: Stefania Marie is a 69 y.o. female.    Chief Complaint:  New patient  Shortness of breath  Hypertension  Chest discomfort    History of Present Illness  The patient is a pleasant 69-year-old female is having progressively increasing shortness of breath for last 1 month with activities such as daily work at home associated with bilateral shoulder discomfort dull aching sensation without any radiation into the chest or into the neck.  It is not associated with any sweating dizziness palpitations or syncope.  Denies having any other associated aggravating or elevating factors.  Symptoms are moderate  in nature.  Exertional.  Intermittent.  Patient had stress Cardiolite test and echocardiogram in February 2020.  Symptoms are less recently.    The patient is not having any palpitations, dizziness or syncope.  Denies having any headache ,abdominal pain ,nausea, vomiting , diarrhea constipation, loss of weight or loss of appetite.  Denies having any excessive bruising ,hematuria or blood in the stool.    Review of all systems negative except as indicated  Assessment and Plan     ]]]]]]]]]]]]]]]]]]  Impression  ==========  -Exertional shortness of breath and bilateral shoulder discomfort suggestive of angina pectoris.    Echocardiogram 2/4/2020  · Left ventricular systolic function is normal.  · Estimated EF = 55%.  · Left ventricular diastolic dysfunction (grade I) consistent with impaired relaxation.  · Mild tricuspid valve regurgitation is present.  Calculated right ventricular systolic pressure from tricuspid regurgitation is 53.2 mmHg.     Lexiscan Cardiolite test 2/4/2020  · myocardial perfusion imaging indicates a normal myocardial perfusion study with no evidence of ischemia.  · Impressions are consistent with a low risk study.  · This is normal Cardiolite imaging stress test with no evidence of ischemia or myocardiDiaphragmatic attenuation artifact is  present.  · Left ventricular ejection fraction is normal (Calculated EF = 58%).  · Mal infarction. There is mild fixed defect of inferior/septal wall was noted which showed normal thickening and contact LAD consistent with attenuation artifact. Clinical correlation recommended. Further recommendation as per ordering physician..    -Hypertension history of seizure disorder sleep apnea GERD    -Exogenous obesity    -Status post cholecystectomy tubal ligation    -Family history of coronary artery disease    -Former smoker.  Stopped smoking in January 2020    -Allergic to penicillin.  ============  Plan  ========  Recent EKG 2/3/2020 revealed sinus tachycardia nonspecific ST-T wave changes  Patient has symptoms that are concerning for angina pectoris.  Symptoms are better recently.  Patient has multiple risk factors.  Recent EKG 2/3/2020 showed sinus rhythm without any ischemic changes  Stress Cardiolite test and echocardiogram as above from February 2020.  Medications were reviewed and updated.  Follow-up in the office 6 weeks.  Consideration would be given for cardiac catheterization (right and left) if patient has continued symptoms.  Further plan will depend on patient's progress.  [[[[[[[[[[[[[[[[[[[       Diagnosis Plan   1. Shortness of breath  Adult Transthoracic Echo Complete W/ Cont if Necessary Per Protocol    Stress Test With Myocardial Perfusion One Day   2. Sleep apnea, unspecified type  Adult Transthoracic Echo Complete W/ Cont if Necessary Per Protocol    Stress Test With Myocardial Perfusion One Day   3. Tobacco dependency  Adult Transthoracic Echo Complete W/ Cont if Necessary Per Protocol    Stress Test With Myocardial Perfusion One Day   4. Chest pressure  Adult Transthoracic Echo Complete W/ Cont if Necessary Per Protocol    Stress Test With Myocardial Perfusion One Day   LAB RESULTS (LAST 7 DAYS)    CBC        BMP        CMP         BNP        TROPONIN        CoAg        Creatinine Clearance  CrCl  cannot be calculated (Patient's most recent lab result is older than the maximum 30 days allowed.).    ABG        Radiology  No radiology results for the last day                The following portions of the patient's history were reviewed and updated as appropriate: allergies, current medications, past family history, past medical history, past social history, past surgical history and problem list.    Review of Systems   Constitution: Negative for malaise/fatigue.   Cardiovascular: Positive for leg swelling. Negative for chest pain, palpitations and syncope.   Respiratory: Positive for shortness of breath.    Skin: Negative for rash.   Gastrointestinal: Negative for nausea and vomiting.   Neurological: Negative for dizziness, light-headedness and numbness.         Current Outpatient Medications:   •  aspirin (ADULT ASPIRIN EC LOW STRENGTH) 81 MG EC tablet, , Disp: , Rfl:   •  busPIRone (BUSPAR) 10 MG tablet, Take 10 mg by mouth 4 (Four) Times a Day., Disp: , Rfl:   •  Calcium Carb-Cholecalciferol 1000-800 MG-UNIT tablet, CALTRATE 600+D3 TABS, Disp: , Rfl:   •  diclofenac (VOLTAREN) 50 MG EC tablet, TAKE 1 TABLET BY MOUTH AS NEEDED FOR HEADACHE, MAY REPEAT IN 4 HOURS ONCE, Disp: 180 tablet, Rfl: 0  •  gabapentin (NEURONTIN) 400 MG capsule, Take 1 capsule by mouth 3 (Three) Times a Day., Disp: 270 capsule, Rfl: 1  •  levETIRAcetam (KEPPRA) 500 MG tablet, Take 1 tablet by mouth 2 (Two) Times a Day., Disp: 180 tablet, Rfl: 3  •  metoprolol succinate XL (TOPROL-XL) 25 MG 24 hr tablet, Take 1 tablet by mouth Daily., Disp: 30 tablet, Rfl: 0  •  montelukast (SINGULAIR) 10 MG tablet, Take 10 mg by mouth Every Night., Disp: , Rfl: 3  •  Multiple Vitamins-Minerals (CENTRUM ADULTS PO), Take 1 tablet by mouth Daily., Disp: , Rfl:   •  omeprazole (priLOSEC) 40 MG capsule, TK ONE C PO  ONCE D, Disp: , Rfl: 4  •  PARoxetine (PAXIL) 20 MG tablet, Take 20 mg by mouth Every Morning., Disp: , Rfl:   •  potassium chloride  "(K-DUR,KLOR-CON) 20 MEQ CR tablet, Take 20 mEq by mouth 3 (Three) Times a Day., Disp: , Rfl:   •  topiramate (TOPAMAX) 200 MG tablet, Take 1 tablet by mouth 2 (Two) Times a Day., Disp: 180 tablet, Rfl: 3  •  traZODone (DESYREL) 50 MG tablet, TAKE 1 TABLET BY MOUTH DAILY AT BEDTIME, Disp: 90 tablet, Rfl: 1    Allergies   Allergen Reactions   • Penicillins Itching       Family History   Problem Relation Age of Onset   • Hypertension Mother    • Cancer Sister    • Diabetes Brother    • Heart attack Nephew    • Diabetes Niece        Past Surgical History:   Procedure Laterality Date   • GALLBLADDER SURGERY     • TUBAL ABDOMINAL LIGATION         Past Medical History:   Diagnosis Date   • Anxiety    • Environmental and seasonal allergies    • GERD (gastroesophageal reflux disease)    • Migraine    • Obesity    • Seizures (CMS/HCC)    • Sleep apnea    • Tobacco dependency        Family History   Problem Relation Age of Onset   • Hypertension Mother    • Cancer Sister    • Diabetes Brother    • Heart attack Nephew    • Diabetes Niece        Social History     Socioeconomic History   • Marital status:      Spouse name: Not on file   • Number of children: Not on file   • Years of education: Not on file   • Highest education level: Not on file   Tobacco Use   • Smoking status: Former Smoker     Packs/day: 2.00     Years: 30.00     Pack years: 60.00     Types: Cigarettes     Last attempt to quit: 2020     Years since quittin.1   • Smokeless tobacco: Never Used   Substance and Sexual Activity   • Alcohol use: No     Frequency: Never   • Drug use: Never   • Sexual activity: Defer         Procedures      Objective:       Physical Exam    /88 (BP Location: Left arm, Patient Position: Sitting, Cuff Size: Adult) Comment (BP Location): wrist  Pulse 60   Ht 157.5 cm (62\")   Wt 110 kg (243 lb)   SpO2 93%   BMI 44.45 kg/m²   The patient is alert, oriented and in no distress.    Vital signs as noted above.  " Exogenous obesity.    Head and neck revealed no carotid bruits or jugular venous distension.  No thyromegaly or lymphadenopathy is present.    Lungs clear.  No wheezing.  Breath sounds are normal bilaterally.    Heart normal first and second heart sounds.  No murmur..  No pericardial rub is present.  No gallop is present.    Abdomen soft and nontender.  No organomegaly is present.    Extremities revealed good peripheral pulses without any pedal edema.    Skin warm and dry.    Musculoskeletal system is grossly normal.    CNS grossly normal.

## 2020-04-13 RX ORDER — TRAZODONE HYDROCHLORIDE 50 MG/1
TABLET ORAL
Qty: 90 TABLET | Refills: 1 | Status: SHIPPED | OUTPATIENT
Start: 2020-04-13 | End: 2020-10-07

## 2020-04-30 ENCOUNTER — TELEMEDICINE (OUTPATIENT)
Dept: CARDIOLOGY | Facility: CLINIC | Age: 70
End: 2020-04-30

## 2020-04-30 VITALS — BODY MASS INDEX: 44.72 KG/M2 | WEIGHT: 243 LBS | HEIGHT: 62 IN

## 2020-04-30 DIAGNOSIS — R06.00 DYSPNEA, UNSPECIFIED TYPE: ICD-10-CM

## 2020-04-30 DIAGNOSIS — G47.30 SLEEP APNEA, UNSPECIFIED TYPE: ICD-10-CM

## 2020-04-30 DIAGNOSIS — R07.89 CHEST PRESSURE: ICD-10-CM

## 2020-04-30 DIAGNOSIS — R06.02 SHORTNESS OF BREATH: Primary | ICD-10-CM

## 2020-04-30 PROCEDURE — 99443 PR PHYS/QHP TELEPHONE EVALUATION 21-30 MIN: CPT | Performed by: INTERNAL MEDICINE

## 2020-04-30 RX ORDER — BACLOFEN 10 MG/1
TABLET ORAL
COMMUNITY
Start: 2020-04-15

## 2020-04-30 RX ORDER — METHIMAZOLE 10 MG/1
10 TABLET ORAL 2 TIMES DAILY
COMMUNITY
Start: 2020-04-13

## 2020-04-30 RX ORDER — FLUTICASONE PROPIONATE 50 MCG
SPRAY, SUSPENSION (ML) NASAL
COMMUNITY
Start: 2020-03-17

## 2020-04-30 NOTE — PROGRESS NOTES
You have chosen to receive care through a video visit. Do you consent to use a video visit for your medical care today? Yes  Patient is having technical difficulty with the video visit and was converted to a telephone visit.  Total time spent 21 minutes.    Encounter Date:04/30/2020  Last seen 3/9/2020      Patient ID: Stefania Marie is a 69 y.o. female.    Chief Complaint:  Shortness of breath  Hypertension  GERD  Sleep apnea    History of Present Illness  Since I have last seen, the patient has been without any chest discomfort ,shortness of breath, palpitations, dizziness or syncope.  Denies having any headache ,abdominal pain ,nausea, vomiting , diarrhea constipation, loss of weight or loss of appetite.  Denies having any excessive bruising ,hematuria or blood in the stool.    Review of all systems negative except as indicated    Assessment and Plan       ]]]]]]]]]]]]]]]]]]  Impression  ==========  -Exertional shortness of breath and bilateral shoulder discomfort suggestive of angina pectoris.     Echocardiogram 2/4/2020  · Left ventricular systolic function is normal.  · Estimated EF = 55%.  · Left ventricular diastolic dysfunction (grade I) consistent with impaired relaxation.  · Mild tricuspid valve regurgitation is present.  Calculated right ventricular systolic pressure from tricuspid regurgitation is 53.2 mmHg.      Lexiscan Cardiolite test 2/4/2020  · myocardial perfusion imaging indicates a normal myocardial perfusion study with no evidence of ischemia.  · Impressions are consistent with a low risk study.  · This is normal Cardiolite imaging stress test with no evidence of ischemia or myocardiDiaphragmatic attenuation artifact is present.  · Left ventricular ejection fraction is normal (Calculated EF = 58%).  · Mal infarction. There is mild fixed defect of inferior/septal wall was noted which showed normal thickening and contact LAD consistent with attenuation artifact. Clinical correlation  recommended. Further recommendation as per ordering physician..     -Hypertension history of seizure disorder sleep apnea GERD     -Exogenous obesity     -Status post cholecystectomy tubal ligation     -Family history of coronary artery disease     -Former smoker.  Stopped smoking in January 2020     -Allergic to penicillin.  ============  Plan  ========  Video visit- telephone visit-patient having technical difficulty with the cell phone.  Total time spent 21 minutes trying to reach her multiple times and try to help her with the cell phone to convert to video visit.  Patient is not having any angina pectoris or congestive heart failure  Medications were reviewed and updated.  Follow-up in the office 3 months  Consideration would be given for cardiac catheterization (right and left) if patient has continued symptoms.  Further plan will depend on patient's progress.  [[[[[[[[[[[[[[[[[[[           Diagnosis Plan   1. Shortness of breath     2. Sleep apnea, unspecified type     3. Dyspnea, unspecified type     4. Chest pressure     LAB RESULTS (LAST 7 DAYS)    CBC        BMP        CMP         BNP        TROPONIN        CoAg        Creatinine Clearance  CrCl cannot be calculated (Patient's most recent lab result is older than the maximum 30 days allowed.).    ABG        Radiology  No radiology results for the last day                The following portions of the patient's history were reviewed and updated as appropriate: allergies, current medications, past family history, past medical history, past social history, past surgical history and problem list.    Review of Systems   Cardiovascular: Negative for chest pain, leg swelling and palpitations.   Respiratory: Negative for shortness of breath.    Neurological: Negative for dizziness and numbness.         Current Outpatient Medications:   •  aspirin (ADULT ASPIRIN EC LOW STRENGTH) 81 MG EC tablet, , Disp: , Rfl:   •  baclofen (LIORESAL) 10 MG tablet, TK 1 T PO TID  PRN, Disp: , Rfl:   •  busPIRone (BUSPAR) 10 MG tablet, Take 10 mg by mouth 4 (Four) Times a Day., Disp: , Rfl:   •  Calcium Carb-Cholecalciferol 1000-800 MG-UNIT tablet, CALTRATE 600+D3 TABS, Disp: , Rfl:   •  diclofenac (VOLTAREN) 50 MG EC tablet, TAKE 1 TABLET BY MOUTH AS NEEDED FOR HEADACHE, MAY REPEAT IN 4 HOURS ONCE, Disp: 180 tablet, Rfl: 0  •  gabapentin (NEURONTIN) 400 MG capsule, Take 1 capsule by mouth 3 (Three) Times a Day., Disp: 270 capsule, Rfl: 1  •  levETIRAcetam (KEPPRA) 500 MG tablet, Take 1 tablet by mouth 2 (Two) Times a Day., Disp: 180 tablet, Rfl: 3  •  methIMAzole (TAPAZOLE) 10 MG tablet, Take 10 mg by mouth 2 (Two) Times a Day., Disp: , Rfl:   •  metoprolol succinate XL (TOPROL-XL) 25 MG 24 hr tablet, Take 1 tablet by mouth Daily., Disp: 30 tablet, Rfl: 0  •  montelukast (SINGULAIR) 10 MG tablet, Take 10 mg by mouth Every Night., Disp: , Rfl: 3  •  Multiple Vitamins-Minerals (CENTRUM ADULTS PO), Take 1 tablet by mouth Daily., Disp: , Rfl:   •  omeprazole (priLOSEC) 40 MG capsule, TK ONE C PO  ONCE D, Disp: , Rfl: 4  •  PARoxetine (PAXIL) 20 MG tablet, Take 20 mg by mouth Every Morning., Disp: , Rfl:   •  potassium chloride (K-DUR,KLOR-CON) 20 MEQ CR tablet, Take 20 mEq by mouth 3 (Three) Times a Day., Disp: , Rfl:   •  topiramate (TOPAMAX) 200 MG tablet, Take 1 tablet by mouth 2 (Two) Times a Day., Disp: 180 tablet, Rfl: 3  •  traZODone (DESYREL) 50 MG tablet, TAKE 1 TABLET BY MOUTH DAILY AT BEDTIME, Disp: 90 tablet, Rfl: 1  •  fluticasone (FLONASE) 50 MCG/ACT nasal spray, , Disp: , Rfl:     Allergies   Allergen Reactions   • Penicillins Itching       Family History   Problem Relation Age of Onset   • Hypertension Mother    • Cancer Sister    • Diabetes Brother    • Heart attack Nephew    • Diabetes Niece        Past Surgical History:   Procedure Laterality Date   • GALLBLADDER SURGERY     • TUBAL ABDOMINAL LIGATION         Past Medical History:   Diagnosis Date   • Anxiety    •  "Environmental and seasonal allergies    • GERD (gastroesophageal reflux disease)    • Migraine    • Obesity    • Seizures (CMS/HCC)    • Sleep apnea    • Tobacco dependency        Family History   Problem Relation Age of Onset   • Hypertension Mother    • Cancer Sister    • Diabetes Brother    • Heart attack Nephew    • Diabetes Niece        Social History     Socioeconomic History   • Marital status:      Spouse name: Not on file   • Number of children: Not on file   • Years of education: Not on file   • Highest education level: Not on file   Tobacco Use   • Smoking status: Former Smoker     Packs/day: 2.00     Years: 30.00     Pack years: 60.00     Types: Cigarettes     Last attempt to quit: 2020     Years since quittin.2   • Smokeless tobacco: Never Used   Substance and Sexual Activity   • Alcohol use: No     Frequency: Never   • Drug use: Never   • Sexual activity: Defer         Procedures      Objective:       Physical Exam    Ht 157.5 cm (62\")   Wt 110 kg (243 lb)   BMI 44.45 kg/m²   The patient is alert, oriented and in no distress.    Vital signs as noted above.    Speech is normal.    No audible shortness of breath.    CNS grossly normal.        "

## 2020-05-26 ENCOUNTER — OFFICE VISIT (OUTPATIENT)
Dept: NEUROLOGY | Facility: CLINIC | Age: 70
End: 2020-05-26

## 2020-05-26 VITALS
HEART RATE: 66 BPM | TEMPERATURE: 98 F | BODY MASS INDEX: 43.54 KG/M2 | SYSTOLIC BLOOD PRESSURE: 170 MMHG | DIASTOLIC BLOOD PRESSURE: 89 MMHG | WEIGHT: 236.6 LBS | HEIGHT: 62 IN

## 2020-05-26 DIAGNOSIS — G40.209 PARTIAL SYMPTOMATIC EPILEPSY WITH COMPLEX PARTIAL SEIZURES, NOT INTRACTABLE, WITHOUT STATUS EPILEPTICUS (HCC): Primary | Chronic | ICD-10-CM

## 2020-05-26 DIAGNOSIS — G43.009 MIGRAINE WITHOUT AURA AND WITHOUT STATUS MIGRAINOSUS, NOT INTRACTABLE: Chronic | ICD-10-CM

## 2020-05-26 DIAGNOSIS — G47.30 SLEEP APNEA, UNSPECIFIED TYPE: Chronic | ICD-10-CM

## 2020-05-26 PROCEDURE — 99214 OFFICE O/P EST MOD 30 MIN: CPT | Performed by: PSYCHIATRY & NEUROLOGY

## 2020-05-26 RX ORDER — EPINEPHRINE 0.3 MG/.3ML
INJECTION SUBCUTANEOUS
COMMUNITY
Start: 2020-05-19

## 2020-05-26 RX ORDER — LEVETIRACETAM 500 MG/1
750 TABLET ORAL 2 TIMES DAILY
Qty: 180 TABLET | Refills: 3 | Status: SHIPPED | OUTPATIENT
Start: 2020-05-26 | End: 2021-02-16 | Stop reason: SDUPTHER

## 2020-05-26 RX ORDER — LEVETIRACETAM 500 MG/1
TABLET ORAL
Qty: 270 TABLET | OUTPATIENT
Start: 2020-05-26

## 2020-05-26 RX ORDER — TOPIRAMATE 100 MG/1
100 TABLET, FILM COATED ORAL 2 TIMES DAILY
Qty: 180 TABLET | Refills: 3 | Status: SHIPPED | OUTPATIENT
Start: 2020-05-26 | End: 2021-03-30 | Stop reason: SDUPTHER

## 2020-05-26 NOTE — PROGRESS NOTES
Sleep medicine follow-up visit    Stefania Marie   1950  69 y.o. female   DATE OF SERVICE: 5/26/2020     ALEX, f/u with new CPAP machine, patient doing well with pap therapy and uses nasal pillows and through [facility] for supplies.   On NPSG at [facility] , 03/18/2014 patient had Severe obstructive sleep apnea syndrome with apnea-hypopnea index of 41.9 per sleep hour, minimum SpO2 of 79%  Pt  Has CPAP machine. Uses on regular basis.     CPAP download not available at this time, new machine this year.      The patient feels great and is benefiting from it and is compliant.     Seizures, controlled with keppra last spell was last week was talking and had a mini seizures two of those this month and a couple the month of february. No generalized onic clonic seizures    Migraine w/o aura, improved with Topamax patient has oc headaches.     Morbid Obesity, trying lose weight has lost 7 pounds since last visit.  BMI 43    Pt co hair loss. Pt co poor memory    Review of Systems   Constitutional: Positive for fatigue. Negative for appetite change.   HENT: Negative for sinus pressure and sinus pain.    Eyes: Negative for pain and itching.   Respiratory: Negative for cough and shortness of breath.    Cardiovascular: Negative for chest pain and palpitations.   Gastrointestinal: Negative for constipation and diarrhea.   Endocrine: Negative for cold intolerance and heat intolerance.   Genitourinary: Positive for frequency. Negative for difficulty urinating.   Musculoskeletal: Positive for back pain. Negative for neck pain.   Allergic/Immunologic: Negative for environmental allergies.   Neurological: Positive for dizziness and headaches. Negative for tremors, seizures, syncope, facial asymmetry, speech difficulty, weakness, light-headedness and numbness.   Psychiatric/Behavioral: Negative for agitation and confusion.     I reviewed and addressed ROS entered by MA.         The following portions of the patient's history were reviewed  and updated as appropriate: allergies, current medications, past family history, past medical history, past social history, past surgical history and problem list.      Family History   Problem Relation Age of Onset   • Hypertension Mother    • Cancer Sister    • Diabetes Brother    • Heart attack Nephew    • Diabetes Niece        Past Medical History:   Diagnosis Date   • Anxiety    • Environmental and seasonal allergies    • GERD (gastroesophageal reflux disease)    • Migraine    • Obesity    • Seizures (CMS/HCC)    • Sleep apnea    • Tobacco dependency        Social History     Socioeconomic History   • Marital status:      Spouse name: Not on file   • Number of children: Not on file   • Years of education: Not on file   • Highest education level: Not on file   Tobacco Use   • Smoking status: Former Smoker     Packs/day: 2.00     Years: 30.00     Pack years: 60.00     Types: Cigarettes     Last attempt to quit: 2020     Years since quittin.3   • Smokeless tobacco: Never Used   Substance and Sexual Activity   • Alcohol use: No     Frequency: Never   • Drug use: Never   • Sexual activity: Defer         Current Outpatient Medications:   •  aspirin (ADULT ASPIRIN EC LOW STRENGTH) 81 MG EC tablet, , Disp: , Rfl:   •  baclofen (LIORESAL) 10 MG tablet, TK 1 T PO TID PRN, Disp: , Rfl:   •  busPIRone (BUSPAR) 10 MG tablet, Take 10 mg by mouth 4 (Four) Times a Day., Disp: , Rfl:   •  Calcium Carb-Cholecalciferol 1000-800 MG-UNIT tablet, CALTRATE 600+D3 TABS, Disp: , Rfl:   •  diclofenac (VOLTAREN) 50 MG EC tablet, TAKE 1 TABLET BY MOUTH AS NEEDED FOR HEADACHE, MAY REPEAT IN 4 HOURS ONCE, Disp: 180 tablet, Rfl: 0  •  EPINEPHrine (EPIPEN) 0.3 MG/0.3ML solution auto-injector injection, INJ UTD, Disp: , Rfl:   •  fluticasone (FLONASE) 50 MCG/ACT nasal spray, , Disp: , Rfl:   •  gabapentin (NEURONTIN) 400 MG capsule, Take 1 capsule by mouth 3 (Three) Times a Day., Disp: 270 capsule, Rfl: 1  •  levETIRAcetam  (KEPPRA) 500 MG tablet, Take 1.5 tablets by mouth 2 (Two) Times a Day., Disp: 180 tablet, Rfl: 3  •  methIMAzole (TAPAZOLE) 10 MG tablet, Take 10 mg by mouth 2 (Two) Times a Day., Disp: , Rfl:   •  metoprolol succinate XL (TOPROL-XL) 25 MG 24 hr tablet, Take 1 tablet by mouth Daily., Disp: 30 tablet, Rfl: 0  •  montelukast (SINGULAIR) 10 MG tablet, Take 10 mg by mouth Every Night., Disp: , Rfl: 3  •  Multiple Vitamins-Minerals (CENTRUM ADULTS PO), Take 1 tablet by mouth Daily., Disp: , Rfl:   •  omeprazole (priLOSEC) 40 MG capsule, TK ONE C PO  ONCE D, Disp: , Rfl: 4  •  PARoxetine (PAXIL) 20 MG tablet, Take 20 mg by mouth Every Morning., Disp: , Rfl:   •  potassium chloride (K-DUR,KLOR-CON) 20 MEQ CR tablet, Take 20 mEq by mouth 3 (Three) Times a Day., Disp: , Rfl:   •  topiramate (TOPAMAX) 200 MG tablet, Take 1 tablet by mouth 2 (Two) Times a Day., Disp: 180 tablet, Rfl: 3  •  traZODone (DESYREL) 50 MG tablet, TAKE 1 TABLET BY MOUTH DAILY AT BEDTIME, Disp: 90 tablet, Rfl: 1    Allergies   Allergen Reactions   • Penicillins Itching        PHYSICAL EXAMINATION:  Vitals:    05/26/20 0840   BP: 170/89   Pulse: 66   Temp: 98 °F (36.7 °C)      Body mass index is 43.27 kg/m².       HEENT: Normal.      EXTREMITIES: No edema.     IMPRESSION:     Patient with obstructive sleep apnea pt reports doing well with CPAP    Seizure disorder, no generalized seizure    Hair loss, may be due to Topamax     Obesity improved    Poor memory, may be in part due to topamax      RECOMMENDATIONS:   1. Continue present CPAP. Request CPAP download  from [employer]  2. Increase Keppra to 750 mg bid and decrease Topamax to 100 mg bid  3. Continue weight loss efforts  2. Follow up 1 year.     EPWORTH SLEEPINESS SCALE  Sitting and reading  0  WatchingTV  0  Sitting, inactive, in a public place  0  As a passenger in a car for 1 hour w/o a break  0  Lying down to rest in the afternoon  0  Sitting and talking to someone  0  Sitting quietly after a  lunch  0  In a car, while stopped for traffic or a light  0  Total 0          This document has been electronically signed by Joseph Seipel, MD on May 26, 2020 09:16

## 2020-06-04 ENCOUNTER — TELEPHONE (OUTPATIENT)
Dept: NEUROLOGY | Facility: CLINIC | Age: 70
End: 2020-06-04

## 2020-06-04 DIAGNOSIS — G47.30 SLEEP APNEA, UNSPECIFIED TYPE: Primary | ICD-10-CM

## 2020-06-04 NOTE — TELEPHONE ENCOUNTER
----- Message from Joseph F Seipel, MD sent at 5/26/2020  9:10 AM EDT -----   nasal pillows and goes through [facility]     Pt indicates since her visit here November she obtained a new machine    Need down load,

## 2020-06-09 ENCOUNTER — TELEPHONE (OUTPATIENT)
Dept: NEUROLOGY | Facility: CLINIC | Age: 70
End: 2020-06-09

## 2020-07-13 RX ORDER — GABAPENTIN 400 MG/1
CAPSULE ORAL
Qty: 270 CAPSULE | Refills: 1 | Status: SHIPPED | OUTPATIENT
Start: 2020-07-13 | End: 2021-01-04

## 2020-07-15 ENCOUNTER — TELEPHONE (OUTPATIENT)
Dept: NEUROLOGY | Facility: CLINIC | Age: 70
End: 2020-07-15

## 2020-07-15 NOTE — TELEPHONE ENCOUNTER
Received call from patient who stated she has some older medication for her Migraines that she didn't take often and now they have . She recently started Migraines again so she went to take her medication and noticed that it had .     She takes Diclofenac and they  according to the bottle on  and she wants to make sure it is ok for her to still take.     Can someone please contact her at [phone] and discuss this with her?       Thank you

## 2020-08-06 ENCOUNTER — OFFICE VISIT (OUTPATIENT)
Dept: CARDIOLOGY | Facility: CLINIC | Age: 70
End: 2020-08-06

## 2020-08-06 VITALS
HEART RATE: 53 BPM | DIASTOLIC BLOOD PRESSURE: 66 MMHG | BODY MASS INDEX: 47.29 KG/M2 | SYSTOLIC BLOOD PRESSURE: 164 MMHG | OXYGEN SATURATION: 100 % | HEIGHT: 62 IN | WEIGHT: 257 LBS

## 2020-08-06 DIAGNOSIS — R07.89 CHEST PRESSURE: Primary | ICD-10-CM

## 2020-08-06 DIAGNOSIS — R06.02 SHORTNESS OF BREATH: ICD-10-CM

## 2020-08-06 DIAGNOSIS — R77.8 ELEVATED TROPONIN: ICD-10-CM

## 2020-08-06 PROCEDURE — 93000 ELECTROCARDIOGRAM COMPLETE: CPT | Performed by: INTERNAL MEDICINE

## 2020-08-06 PROCEDURE — 99213 OFFICE O/P EST LOW 20 MIN: CPT | Performed by: INTERNAL MEDICINE

## 2020-08-06 RX ORDER — ORLISTAT 120 MG/1
120 CAPSULE ORAL DAILY
COMMUNITY
End: 2021-03-30

## 2020-08-06 RX ORDER — SUMATRIPTAN 100 MG/1
100 TABLET, FILM COATED ORAL
COMMUNITY

## 2020-08-06 NOTE — PROGRESS NOTES
Encounter Date:08/06/2020  Last seen 4/30/2020- video/telephone visit      Patient ID: Stefania Marie is a 70 y.o. female.    Chief Complaint:    Shortness of breath  Hypertension  GERD  Sleep apnea     History of Present Illness    Since I have last seen, the patient has been without any chest discomfort ,shortness of breath, palpitations, dizziness or syncope.  Denies having any headache ,abdominal pain ,nausea, vomiting , diarrhea constipation, loss of weight or loss of appetite.  Denies having any excessive bruising ,hematuria or blood in the stool.     Review of all systems negative except as indicated     Assessment and Plan         ]]]]]]]]]]]]]]]]]]  Impression  ==========  -Exertional shortness of breath and bilateral shoulder discomfort suggestive of angina pectoris.-Improved     Echocardiogram 2/4/2020  · Left ventricular systolic function is normal.  · Estimated EF = 55%.  · Left ventricular diastolic dysfunction (grade I) consistent with impaired relaxation.  · Mild tricuspid valve regurgitation is present.  Calculated right ventricular systolic pressure from tricuspid regurgitation is 53.2 mmHg.      Lexiscan Cardiolite test 2/4/2020  · myocardial perfusion imaging indicates a normal myocardial perfusion study with no evidence of ischemia.  · Impressions are consistent with a low risk study.  · This is normal Cardiolite imaging stress test with no evidence of ischemia or myocardiDiaphragmatic attenuation artifact is present.  · Left ventricular ejection fraction is normal (Calculated EF = 58%).  · Mal infarction. There is mild fixed defect of inferior/septal wall was noted which showed normal thickening and contact LAD consistent with attenuation artifact. Clinical correlation recommended. Further recommendation as per ordering physician..     -Hypertension history of seizure disorder sleep apnea GERD     -Exogenous obesity     -Status post cholecystectomy tubal ligation     -Family history of  coronary artery disease     -Former smoker.  Stopped smoking in January 2020     -Allergic to penicillin.  ============  Plan  ========  EKG showed sinus rhythm without any ischemic changes  Patient is not having any angina pectoris or congestive heart failure  Medications were reviewed and updated.  Follow-up in the office  6 months  No need for cardiac catheterization at this time.  Further plan will depend on patient's progress.  [[[[[[[[[[[[[[[[[[[                   Diagnosis Plan   1. Chest pressure     2. Shortness of breath     3. Elevated troponin     LAB RESULTS (LAST 7 DAYS)    CBC        BMP        CMP         BNP        TROPONIN        CoAg        Creatinine Clearance  CrCl cannot be calculated (Patient's most recent lab result is older than the maximum 30 days allowed.).    ABG        Radiology  No radiology results for the last day                The following portions of the patient's history were reviewed and updated as appropriate: allergies, current medications, past family history, past medical history, past social history, past surgical history and problem list.    Review of Systems   Constitution: Negative for malaise/fatigue.   Cardiovascular: Negative for chest pain, leg swelling, palpitations and syncope.   Respiratory: Negative for shortness of breath.    Skin: Negative for rash.   Gastrointestinal: Negative for nausea and vomiting.   Neurological: Negative for dizziness, light-headedness and numbness.         Current Outpatient Medications:   •  aspirin (ADULT ASPIRIN EC LOW STRENGTH) 81 MG EC tablet, , Disp: , Rfl:   •  busPIRone (BUSPAR) 10 MG tablet, Take 10 mg by mouth 4 (Four) Times a Day., Disp: , Rfl:   •  Calcium Carb-Cholecalciferol 1000-800 MG-UNIT tablet, CALTRATE 600+D3 TABS, Disp: , Rfl:   •  EPINEPHrine (EPIPEN) 0.3 MG/0.3ML solution auto-injector injection, INJ UTD, Disp: , Rfl:   •  gabapentin (NEURONTIN) 400 MG capsule, TAKE 1 CAPSULE BY MOUTH THREE TIMES DAILY, Disp: 270  capsule, Rfl: 1  •  methIMAzole (TAPAZOLE) 10 MG tablet, Take 10 mg by mouth 2 (Two) Times a Day., Disp: , Rfl:   •  montelukast (SINGULAIR) 10 MG tablet, Take 10 mg by mouth Every Night., Disp: , Rfl: 3  •  Multiple Vitamins-Minerals (CENTRUM ADULTS PO), Take 1 tablet by mouth Daily., Disp: , Rfl:   •  omeprazole (priLOSEC) 40 MG capsule, TK ONE C PO  ONCE D, Disp: , Rfl: 4  •  orlistat (XENICAL) 120 MG capsule, Take 120 mg by mouth Daily., Disp: , Rfl:   •  PARoxetine (PAXIL) 20 MG tablet, Take 20 mg by mouth Every Morning., Disp: , Rfl:   •  potassium chloride (K-DUR,KLOR-CON) 20 MEQ CR tablet, Take 20 mEq by mouth 3 (Three) Times a Day., Disp: , Rfl:   •  SUMAtriptan (IMITREX) 100 MG tablet, Take 100 mg by mouth Every 2 (Two) Hours As Needed for Migraine. Take one tablet at onset of headache. May repeat dose one time in 2 hours if headache not relieved., Disp: , Rfl:   •  topiramate (TOPAMAX) 100 MG tablet, Take 1 tablet by mouth 2 (Two) Times a Day., Disp: 180 tablet, Rfl: 3  •  baclofen (LIORESAL) 10 MG tablet, TK 1 T PO TID PRN, Disp: , Rfl:   •  diclofenac (VOLTAREN) 50 MG EC tablet, TAKE 1 TABLET BY MOUTH AS NEEDED FOR HEADACHE, MAY REPEAT IN 4 HOURS ONCE, Disp: 180 tablet, Rfl: 0  •  fluticasone (FLONASE) 50 MCG/ACT nasal spray, , Disp: , Rfl:   •  levETIRAcetam (KEPPRA) 500 MG tablet, Take 1.5 tablets by mouth 2 (Two) Times a Day., Disp: 180 tablet, Rfl: 3  •  metoprolol succinate XL (TOPROL-XL) 25 MG 24 hr tablet, Take 1 tablet by mouth Daily., Disp: 30 tablet, Rfl: 0  •  traZODone (DESYREL) 50 MG tablet, TAKE 1 TABLET BY MOUTH DAILY AT BEDTIME, Disp: 90 tablet, Rfl: 1    Allergies   Allergen Reactions   • Penicillins Itching       Family History   Problem Relation Age of Onset   • Hypertension Mother    • Cancer Sister    • Diabetes Brother    • Heart attack Nephew    • Diabetes Niece        Past Surgical History:   Procedure Laterality Date   • GALLBLADDER SURGERY     • TUBAL ABDOMINAL LIGATION    "      Past Medical History:   Diagnosis Date   • Anxiety    • Environmental and seasonal allergies    • GERD (gastroesophageal reflux disease)    • Migraine    • Obesity    • Seizures (CMS/HCC)    • Sleep apnea    • Tobacco dependency        Family History   Problem Relation Age of Onset   • Hypertension Mother    • Cancer Sister    • Diabetes Brother    • Heart attack Nephew    • Diabetes Niece        Social History     Socioeconomic History   • Marital status:      Spouse name: Not on file   • Number of children: Not on file   • Years of education: Not on file   • Highest education level: Not on file   Tobacco Use   • Smoking status: Former Smoker     Packs/day: 2.00     Years: 30.00     Pack years: 60.00     Types: Cigarettes     Last attempt to quit: 2020     Years since quittin.5   • Smokeless tobacco: Never Used   Substance and Sexual Activity   • Alcohol use: No     Frequency: Never   • Drug use: Never   • Sexual activity: Defer           ECG 12 Lead  Date/Time: 2020 1:17 PM  Performed by: Anderson Stephens MD  Authorized by: Anderson Stephens MD   Comparison: compared with previous ECG   Comparison to previous ECG: Sinus bradycardia nonspecific ST-T wave changes 54/min normal axis normal intervals no ectopy compared to 2020 sinus reduced level                Objective:       Physical Exam    /66 (BP Location: Left arm, Patient Position: Sitting, Cuff Size: Large Adult) Comment (BP Location): wrist  Pulse 53   Ht 157.5 cm (62\")   Wt 117 kg (257 lb)   SpO2 100%   BMI 47.01 kg/m²   The patient is alert, oriented and in no distress.    Vital signs as noted above.  Exogenous obesity.    Head and neck revealed no carotid bruits or jugular venous distension.  No thyromegaly or lymphadenopathy is present.    Lungs clear.  No wheezing.  Breath sounds are normal bilaterally.    Heart normal first and second heart sounds.  No murmur..  No pericardial rub is present.  No gallop is " present.    Abdomen soft and nontender.  No organomegaly is present.    Extremities revealed good peripheral pulses without any pedal edema.    Skin warm and dry.    Musculoskeletal system is grossly normal.    CNS grossly normal.

## 2020-09-03 ENCOUNTER — TELEPHONE (OUTPATIENT)
Dept: NEUROLOGY | Facility: CLINIC | Age: 70
End: 2020-09-03

## 2020-09-03 NOTE — TELEPHONE ENCOUNTER
PT IS CALLING WANTING TO GET AN APPT TO SEE GRADY SO GRADY CAN SHOW HER HOW TO CHANGE THE FILTERS IN HER CPAP AND WHAT SHE NEEDS TO BRING INTO THE OFFICE WHEN SHE COMES(PT WANTS TO BRING CPAP IN SO GRADY CAN SHOW HER). PLEASE CALL HER BACK [phone]

## 2020-09-15 ENCOUNTER — HOSPITAL ENCOUNTER (EMERGENCY)
Facility: HOSPITAL | Age: 70
Discharge: HOME | End: 2020-09-15
Admitting: EMERGENCY MEDICINE

## 2020-09-15 ENCOUNTER — APPOINTMENT (OUTPATIENT)
Dept: CT IMAGING | Facility: HOSPITAL | Age: 70
End: 2020-09-15

## 2020-09-15 ENCOUNTER — APPOINTMENT (OUTPATIENT)
Dept: GENERAL RADIOLOGY | Facility: HOSPITAL | Age: 70
End: 2020-09-15

## 2020-09-15 VITALS
DIASTOLIC BLOOD PRESSURE: 88 MMHG | OXYGEN SATURATION: 98 % | SYSTOLIC BLOOD PRESSURE: 166 MMHG | HEIGHT: 60 IN | HEART RATE: 67 BPM | BODY MASS INDEX: 50.6 KG/M2 | TEMPERATURE: 98.4 F | WEIGHT: 257.72 LBS | RESPIRATION RATE: 16 BRPM

## 2020-09-15 DIAGNOSIS — R42 DIZZINESS: Primary | ICD-10-CM

## 2020-09-15 LAB
ALBUMIN SERPL-MCNC: 4.1 G/DL (ref 3.5–5.2)
ALBUMIN/GLOB SERPL: 1.3 G/DL
ALP SERPL-CCNC: 93 U/L (ref 39–117)
ALT SERPL W P-5'-P-CCNC: 27 U/L (ref 1–33)
ANION GAP SERPL CALCULATED.3IONS-SCNC: 11 MMOL/L (ref 5–15)
AST SERPL-CCNC: 30 U/L (ref 1–32)
BASOPHILS # BLD AUTO: 0 10*3/MM3 (ref 0–0.2)
BASOPHILS NFR BLD AUTO: 0.4 % (ref 0–1.5)
BILIRUB SERPL-MCNC: 0.2 MG/DL (ref 0–1.2)
BUN SERPL-MCNC: 17 MG/DL (ref 8–23)
BUN SERPL-MCNC: ABNORMAL MG/DL
BUN/CREAT SERPL: ABNORMAL
CALCIUM SPEC-SCNC: 9.1 MG/DL (ref 8.6–10.5)
CHLORIDE SERPL-SCNC: 107 MMOL/L (ref 98–107)
CO2 SERPL-SCNC: 24 MMOL/L (ref 22–29)
CREAT SERPL-MCNC: 1.11 MG/DL (ref 0.57–1)
DEPRECATED RDW RBC AUTO: 42.9 FL (ref 37–54)
EOSINOPHIL # BLD AUTO: 0.2 10*3/MM3 (ref 0–0.4)
EOSINOPHIL NFR BLD AUTO: 2.7 % (ref 0.3–6.2)
ERYTHROCYTE [DISTWIDTH] IN BLOOD BY AUTOMATED COUNT: 13.4 % (ref 12.3–15.4)
GFR SERPL CREATININE-BSD FRML MDRD: 59 ML/MIN/1.73
GLOBULIN UR ELPH-MCNC: 3.1 GM/DL
GLUCOSE BLDC GLUCOMTR-MCNC: 95 MG/DL (ref 70–105)
GLUCOSE SERPL-MCNC: 98 MG/DL (ref 65–99)
HCT VFR BLD AUTO: 36 % (ref 34–46.6)
HGB BLD-MCNC: 11.9 G/DL (ref 12–15.9)
LYMPHOCYTES # BLD AUTO: 1.3 10*3/MM3 (ref 0.7–3.1)
LYMPHOCYTES NFR BLD AUTO: 23.8 % (ref 19.6–45.3)
MAGNESIUM SERPL-MCNC: 1.9 MG/DL (ref 1.6–2.4)
MCH RBC QN AUTO: 30.4 PG (ref 26.6–33)
MCHC RBC AUTO-ENTMCNC: 33 G/DL (ref 31.5–35.7)
MCV RBC AUTO: 92.2 FL (ref 79–97)
MONOCYTES # BLD AUTO: 0.4 10*3/MM3 (ref 0.1–0.9)
MONOCYTES NFR BLD AUTO: 6.9 % (ref 5–12)
NEUTROPHILS NFR BLD AUTO: 3.7 10*3/MM3 (ref 1.7–7)
NEUTROPHILS NFR BLD AUTO: 66.2 % (ref 42.7–76)
NRBC BLD AUTO-RTO: 0 /100 WBC (ref 0–0.2)
PLATELET # BLD AUTO: 212 10*3/MM3 (ref 140–450)
PMV BLD AUTO: 7.5 FL (ref 6–12)
POTASSIUM SERPL-SCNC: 3.5 MMOL/L (ref 3.5–5.2)
PROT SERPL-MCNC: 7.2 G/DL (ref 6–8.5)
RBC # BLD AUTO: 3.91 10*6/MM3 (ref 3.77–5.28)
SODIUM SERPL-SCNC: 142 MMOL/L (ref 136–145)
TROPONIN T SERPL-MCNC: <0.01 NG/ML (ref 0–0.03)
WBC # BLD AUTO: 5.6 10*3/MM3 (ref 3.4–10.8)

## 2020-09-15 PROCEDURE — 80053 COMPREHEN METABOLIC PANEL: CPT | Performed by: PHYSICIAN ASSISTANT

## 2020-09-15 PROCEDURE — 93005 ELECTROCARDIOGRAM TRACING: CPT | Performed by: PHYSICIAN ASSISTANT

## 2020-09-15 PROCEDURE — 70450 CT HEAD/BRAIN W/O DYE: CPT

## 2020-09-15 PROCEDURE — 84484 ASSAY OF TROPONIN QUANT: CPT | Performed by: PHYSICIAN ASSISTANT

## 2020-09-15 PROCEDURE — 99285 EMERGENCY DEPT VISIT HI MDM: CPT

## 2020-09-15 PROCEDURE — 83735 ASSAY OF MAGNESIUM: CPT | Performed by: PHYSICIAN ASSISTANT

## 2020-09-15 PROCEDURE — 71045 X-RAY EXAM CHEST 1 VIEW: CPT

## 2020-09-15 PROCEDURE — 82962 GLUCOSE BLOOD TEST: CPT

## 2020-09-15 PROCEDURE — 85025 COMPLETE CBC W/AUTO DIFF WBC: CPT | Performed by: PHYSICIAN ASSISTANT

## 2020-09-15 RX ORDER — MECLIZINE HYDROCHLORIDE 25 MG/1
25 TABLET ORAL ONCE
Status: COMPLETED | OUTPATIENT
Start: 2020-09-15 | End: 2020-09-15

## 2020-09-15 RX ORDER — MECLIZINE HYDROCHLORIDE 25 MG/1
25 TABLET ORAL 3 TIMES DAILY PRN
Qty: 15 TABLET | Refills: 0 | Status: SHIPPED | OUTPATIENT
Start: 2020-09-15 | End: 2021-03-30

## 2020-09-15 RX ORDER — SODIUM CHLORIDE 0.9 % (FLUSH) 0.9 %
10 SYRINGE (ML) INJECTION AS NEEDED
Status: DISCONTINUED | OUTPATIENT
Start: 2020-09-15 | End: 2020-09-15 | Stop reason: HOSPADM

## 2020-09-15 RX ADMIN — MECLIZINE HYDROCHLORIDE 25 MG: 25 TABLET ORAL at 15:39

## 2020-09-15 NOTE — ED NOTES
Pt presents with dizziness that started this morning. Pt reports when she opens her eyes, the room is spinning. Pt is doing much better now.      Henrietta Estevez RN  09/15/20 [id number]       Henrietta Estevez RN  09/15/20 1608

## 2020-09-15 NOTE — DISCHARGE INSTRUCTIONS
Take meclizine as needed for dizziness.  Drink plenty of clear fluids over the next 2 to 3 days.    Follow-up with your primary care provider in 3-5 days.  If you do not have a primary care provider call [phone] for help in finding one, or you may follow up with [facility] at [phone].    Return to ED for any new or worsening symptoms.    If your dizziness continues follow-up with [facility] for further evaluation and management.

## 2020-09-15 NOTE — ED PROVIDER NOTES
Subjective   Patient is a 70-year-old female PMH significant for anxiety, GERD, migraines, obesity, seizures who presents with complaints of dizziness since around 1030 this morning.  Patient states that she was getting ready to go to her mammogram when she started feeling like the room was spinning around her.  Patient states it is worse when her eyes are open.  He denies any one-sided numbness weakness slurred speech or facial droop.  She also denies any recent falls.  Reports it is better when she keeps her eyes closed and is lying flat.  Reports associated nausea.  Patient states she was recently started on a new medication but is unsure what medication it was..  Pertinent negatives include chest pain, shortness of breath, sensitivity to light or loud noise, vomiting, fever, abdominal pain, urinary symptoms including dysuria or hematuria.  Patient states she is never had symptoms like this in the past          Review of Systems   Constitutional: Negative.    Respiratory: Negative.    Cardiovascular: Negative.    Gastrointestinal: Positive for nausea. Negative for abdominal distention, abdominal pain, constipation, diarrhea and vomiting.   Genitourinary: Negative.    Musculoskeletal: Negative for neck pain and neck stiffness.   Skin: Negative.    Neurological: Positive for dizziness. Negative for tremors, seizures, syncope, facial asymmetry, speech difficulty, weakness, light-headedness, numbness and headaches.       Past Medical History:   Diagnosis Date   • Anxiety    • Environmental and seasonal allergies    • GERD (gastroesophageal reflux disease)    • Migraine    • Obesity    • Seizures (CMS/HCC)    • Sleep apnea    • Tobacco dependency        Allergies   Allergen Reactions   • Penicillins Itching       Past Surgical History:   Procedure Laterality Date   • GALLBLADDER SURGERY     • TUBAL ABDOMINAL LIGATION         Family History   Problem Relation Age of Onset   • Hypertension Mother    • Cancer Sister    •  Diabetes Brother    • Heart attack Nephew    • Diabetes Niece        Social History     Socioeconomic History   • Marital status:      Spouse name: Not on file   • Number of children: Not on file   • Years of education: Not on file   • Highest education level: Not on file   Tobacco Use   • Smoking status: Former Smoker     Packs/day: 2.00     Years: 30.00     Pack years: 60.00     Types: Cigarettes     Quit date: 2020     Years since quittin.6   • Smokeless tobacco: Never Used   Substance and Sexual Activity   • Alcohol use: No     Frequency: Never   • Drug use: Never   • Sexual activity: Defer           Objective   Physical Exam  Vitals signs and nursing note reviewed.   Constitutional:       General: She is not in acute distress.     Appearance: She is well-developed. She is obese. She is not ill-appearing, toxic-appearing or diaphoretic.   HENT:      Head: Normocephalic and atraumatic.      Right Ear: Tympanic membrane, ear canal and external ear normal.      Left Ear: Tympanic membrane, ear canal and external ear normal.      Mouth/Throat:      Mouth: Mucous membranes are moist.      Pharynx: Oropharynx is clear.   Eyes:      General: No scleral icterus.     Extraocular Movements: Extraocular movements intact.      Pupils: Pupils are equal, round, and reactive to light.   Cardiovascular:      Rate and Rhythm: Normal rate and regular rhythm.      Heart sounds: No murmur. No friction rub. No gallop.    Pulmonary:      Effort: Pulmonary effort is normal. No respiratory distress.      Breath sounds: Normal breath sounds. No stridor. No wheezing, rhonchi or rales.   Chest:      Chest wall: No tenderness.   Abdominal:      General: Bowel sounds are normal. There is no distension. There are no signs of injury.      Palpations: Abdomen is soft. There is no fluid wave, hepatomegaly, splenomegaly or mass.      Tenderness: There is no abdominal tenderness. There is no guarding or rebound.      Hernia: No  "hernia is present.   Skin:     General: Skin is warm.      Capillary Refill: Capillary refill takes less than 2 seconds.      Coloration: Skin is not cyanotic, jaundiced or pale.      Findings: No erythema or rash.   Neurological:      General: No focal deficit present.      Mental Status: She is alert and oriented to person, place, and time.      GCS: GCS eye subscore is 4. GCS verbal subscore is 5. GCS motor subscore is 6.      Cranial Nerves: Cranial nerves are intact.      Sensory: Sensation is intact.      Motor: Motor function is intact.      Coordination: Coordination is intact.      Gait: Gait is intact.      Comments: Normal and equal sensation strength throughout moving all extremities freely   Psychiatric:         Mood and Affect: Mood normal.         Behavior: Behavior normal.         Procedures           ED Course    BP (!) 183/74   Pulse 52   Temp 98.4 °F (36.9 °C) (Oral)   Resp 16   Ht 152.4 cm (60\")   Wt 117 kg (257 lb 11.5 oz)   SpO2 97%   Breastfeeding No   BMI 50.33 kg/m²   Medications   sodium chloride 0.9 % flush 10 mL (has no administration in time range)   meclizine (ANTIVERT) tablet 25 mg (25 mg Oral Given 9/15/20 1539)     Labs Reviewed   COMPREHENSIVE METABOLIC PANEL - Abnormal; Notable for the following components:       Result Value    Creatinine 1.11 (*)     eGFR   Amer 59 (*)     All other components within normal limits    Narrative:     GFR Normal >60  Chronic Kidney Disease <60  Kidney Failure <15     CBC WITH AUTO DIFFERENTIAL - Abnormal; Notable for the following components:    Hemoglobin 11.9 (*)     All other components within normal limits   TROPONIN (IN-HOUSE) - Normal    Narrative:     Troponin T Reference Range:  <= 0.03 ng/mL-   Negative for AMI  >0.03 ng/mL-     Abnormal for myocardial necrosis.  Clinicians would have to utilize clinical acumen, EKG, Troponin and serial changes to determine if it is an Acute Myocardial Infarction or myocardial injury due to " an underlying chronic condition.       Results may be falsely decreased if patient taking Biotin.     MAGNESIUM - Normal   BUN - Normal   POCT GLUCOSE FINGERSTICK - Normal   CBC AND DIFFERENTIAL    Narrative:     The following orders were created for panel order CBC & Differential.  Procedure                               Abnormality         Status                     ---------                               -----------         ------                     CBC Auto Differential[[id number]]        Abnormal            Final result                 Please view results for these tests on the individual orders.     Ct Head Without Contrast    Result Date: 9/15/2020  1.No evidence for acute intracranial abnormality.  Electronically Signed By-Zane Chun On:9/15/2020 4:50 PM This report was finalized on [id number] by  Zane Chun, .    Xr Chest 1 View    Result Date: 9/15/2020  Mild to moderate cardiac enlargement appears new or increased compared to prior. It may be slightly eccentric by the portable technique. Underlying pericardial effusion cannot be excluded.  Electronically Signed By-Dr. Deann Salinas MD On:9/15/2020 3:49 PM This report was finalized on [id number] by Dr. Deann Salinas MD.                                           MDM  Number of Diagnoses or Management Options  Dizziness:   Diagnosis management comments: Chart Review:  Comorbidity: Anxiety, GERD, migraines, obesity, seizures  Differentials: CVA, tumor, electrolyte abnormality, benign positional vertigo, otitis media      ;this list is not all inclusive and does not constitute the entirety of considered causes  ECG: Interpreted by myself and Dr. San show sinus bradycardia low voltage in precordial leads consider anterior septal infarct previous EKG was reviewed from 2/4/2020 no significant change  Labs: Troponin within normal limits.  Magnesium 1.9.  CBC shows WC 5.6 hemoglobin 1.9 hematocrit 36 platelets 212.  CMP shows glucose 98 BUN 17  creatinine 1.11 sodium 142 potassium 3.5 old chart reviewed from 7 months ago show a creatinine of 0.86  Imaging: Was interpreted by physician and reviewed by myself:  Ct Head Without Contrast  Result Date: 9/15/2020  1.No evidence for acute intracranial abnormality.  Electronically Signed By-Zane Chun On:9/15/2020 4:50 PM This report was finalized on [id number] by  Zane Chun, .    Xr Chest 1 View  Result Date: 9/15/2020  Mild to moderate cardiac enlargement appears new or increased compared to prior. It may be slightly eccentric by the portable technique. Underlying pericardial effusion cannot be excluded.  Electronically Signed By-Dr. Deann Salinas MD On:9/15/2020 3:49 PM This report was finalized on [id number] by Dr. Deann Salinas MD.    Disposition/Treatment:  Appropriate PPE was worn during exam and throughout all encounters with the patient.  While in the ED IV was placed and labs were obtained patient was afebrile and appeared nontoxic showed no acute cranial focal neural deficits.  Patient's orthostatics were negative for orthostatic hypotension.  Patient was given meclizine and fluids while in the ED upon reassessment patient reports an improvement in her dizziness.  Lab results were fairly unremarkable as above there were no signs of severe infection or electrolyte abnormality.  EKG showed no signs of acute STEMI troponin was within normal limits.  Chest x-ray showed no signs of pneumonia or severe infection.  CT of head was unremarkable as above.  Lab results and findings were discussed with the patient who voiced understanding of discharge instructions along with signs and symptoms requiring return to the ED.  Upon discharged patient was in stable condition with followup for a revaluation.  She will be given meclizine for home she was advised that her new medication could potentially be a cause of her symptoms or could potentially be her inner ear she was advised to follow-up with  primary care provider and [facility] for further evaluation and management.  Patient was in agreement with plan       Amount and/or Complexity of Data Reviewed  Clinical lab tests: reviewed  Tests in the radiology section of CPT®: reviewed  Tests in the medicine section of CPT®: reviewed        Final diagnoses:   Dizziness            Connor Ch PA  09/15/20 [id number]       Connor Ch PA  09/15/20 [id number]

## 2020-10-07 RX ORDER — TRAZODONE HYDROCHLORIDE 50 MG/1
TABLET ORAL
Qty: 90 TABLET | Refills: 0 | Status: SHIPPED | OUTPATIENT
Start: 2020-10-07 | End: 2020-12-31

## 2020-12-31 RX ORDER — TRAZODONE HYDROCHLORIDE 50 MG/1
TABLET ORAL
Qty: 90 TABLET | Refills: 0 | Status: SHIPPED | OUTPATIENT
Start: 2020-12-31 | End: 2021-03-31

## 2021-01-04 RX ORDER — GABAPENTIN 400 MG/1
CAPSULE ORAL
Qty: 270 CAPSULE | Refills: 0 | Status: SHIPPED | OUTPATIENT
Start: 2021-01-04 | End: 2021-03-31 | Stop reason: SDUPTHER

## 2021-02-16 RX ORDER — LEVETIRACETAM 500 MG/1
750 TABLET ORAL 2 TIMES DAILY
Qty: 180 TABLET | Refills: 3 | Status: SHIPPED | OUTPATIENT
Start: 2021-02-16 | End: 2021-03-30 | Stop reason: SDUPTHER

## 2021-03-07 ENCOUNTER — APPOINTMENT (OUTPATIENT)
Dept: GENERAL RADIOLOGY | Facility: HOSPITAL | Age: 71
End: 2021-03-07

## 2021-03-07 ENCOUNTER — HOSPITAL ENCOUNTER (EMERGENCY)
Facility: HOSPITAL | Age: 71
Discharge: HOME | End: 2021-03-07
Attending: EMERGENCY MEDICINE | Admitting: EMERGENCY MEDICINE

## 2021-03-07 VITALS
HEART RATE: 85 BPM | SYSTOLIC BLOOD PRESSURE: 141 MMHG | DIASTOLIC BLOOD PRESSURE: 82 MMHG | WEIGHT: 235.01 LBS | HEIGHT: 60 IN | RESPIRATION RATE: 17 BRPM | OXYGEN SATURATION: 98 % | BODY MASS INDEX: 46.14 KG/M2 | TEMPERATURE: 98 F

## 2021-03-07 DIAGNOSIS — R07.9 CHEST PAIN, UNSPECIFIED TYPE: Primary | ICD-10-CM

## 2021-03-07 LAB
ANION GAP SERPL CALCULATED.3IONS-SCNC: 13 MMOL/L (ref 5–15)
APTT PPP: 25.1 SECONDS (ref 24–31)
BASOPHILS # BLD AUTO: 0 10*3/MM3 (ref 0–0.2)
BASOPHILS NFR BLD AUTO: 0.7 % (ref 0–1.5)
BUN SERPL-MCNC: 9 MG/DL (ref 8–23)
BUN/CREAT SERPL: 9.9 (ref 7–25)
CALCIUM SPEC-SCNC: 9.8 MG/DL (ref 8.6–10.5)
CHLORIDE SERPL-SCNC: 107 MMOL/L (ref 98–107)
CO2 SERPL-SCNC: 23 MMOL/L (ref 22–29)
CREAT SERPL-MCNC: 0.91 MG/DL (ref 0.57–1)
DEPRECATED RDW RBC AUTO: 39.8 FL (ref 37–54)
EOSINOPHIL # BLD AUTO: 0.1 10*3/MM3 (ref 0–0.4)
EOSINOPHIL NFR BLD AUTO: 2.2 % (ref 0.3–6.2)
ERYTHROCYTE [DISTWIDTH] IN BLOOD BY AUTOMATED COUNT: 12.9 % (ref 12.3–15.4)
GFR SERPL CREATININE-BSD FRML MDRD: 74 ML/MIN/1.73
GLUCOSE SERPL-MCNC: 86 MG/DL (ref 65–99)
HCT VFR BLD AUTO: 34.2 % (ref 34–46.6)
HGB BLD-MCNC: 11.8 G/DL (ref 12–15.9)
HOLD SPECIMEN: NORMAL
INR PPP: 1.01 (ref 0.93–1.1)
LYMPHOCYTES # BLD AUTO: 2.5 10*3/MM3 (ref 0.7–3.1)
LYMPHOCYTES NFR BLD AUTO: 50.5 % (ref 19.6–45.3)
MCH RBC QN AUTO: 29.5 PG (ref 26.6–33)
MCHC RBC AUTO-ENTMCNC: 34.4 G/DL (ref 31.5–35.7)
MCV RBC AUTO: 85.5 FL (ref 79–97)
MONOCYTES # BLD AUTO: 0.5 10*3/MM3 (ref 0.1–0.9)
MONOCYTES NFR BLD AUTO: 10.8 % (ref 5–12)
NEUTROPHILS NFR BLD AUTO: 1.8 10*3/MM3 (ref 1.7–7)
NEUTROPHILS NFR BLD AUTO: 35.8 % (ref 42.7–76)
NRBC BLD AUTO-RTO: 0.4 /100 WBC (ref 0–0.2)
NT-PROBNP SERPL-MCNC: 437.1 PG/ML (ref 0–900)
PLATELET # BLD AUTO: 254 10*3/MM3 (ref 140–450)
PMV BLD AUTO: 7.3 FL (ref 6–12)
POTASSIUM SERPL-SCNC: 3.6 MMOL/L (ref 3.5–5.2)
PROTHROMBIN TIME: 11.1 SECONDS (ref 9.6–11.7)
RBC # BLD AUTO: 4 10*6/MM3 (ref 3.77–5.28)
SODIUM SERPL-SCNC: 143 MMOL/L (ref 136–145)
TROPONIN T SERPL-MCNC: <0.01 NG/ML (ref 0–0.03)
WBC # BLD AUTO: 5 10*3/MM3 (ref 3.4–10.8)

## 2021-03-07 PROCEDURE — 83880 ASSAY OF NATRIURETIC PEPTIDE: CPT | Performed by: EMERGENCY MEDICINE

## 2021-03-07 PROCEDURE — 85610 PROTHROMBIN TIME: CPT | Performed by: EMERGENCY MEDICINE

## 2021-03-07 PROCEDURE — 80048 BASIC METABOLIC PNL TOTAL CA: CPT | Performed by: EMERGENCY MEDICINE

## 2021-03-07 PROCEDURE — 93005 ELECTROCARDIOGRAM TRACING: CPT | Performed by: EMERGENCY MEDICINE

## 2021-03-07 PROCEDURE — 84484 ASSAY OF TROPONIN QUANT: CPT | Performed by: EMERGENCY MEDICINE

## 2021-03-07 PROCEDURE — 71045 X-RAY EXAM CHEST 1 VIEW: CPT

## 2021-03-07 PROCEDURE — 99283 EMERGENCY DEPT VISIT LOW MDM: CPT

## 2021-03-07 PROCEDURE — 85025 COMPLETE CBC W/AUTO DIFF WBC: CPT | Performed by: EMERGENCY MEDICINE

## 2021-03-07 PROCEDURE — 85730 THROMBOPLASTIN TIME PARTIAL: CPT | Performed by: EMERGENCY MEDICINE

## 2021-03-07 RX ORDER — SODIUM CHLORIDE 0.9 % (FLUSH) 0.9 %
10 SYRINGE (ML) INJECTION AS NEEDED
Status: DISCONTINUED | OUTPATIENT
Start: 2021-03-07 | End: 2021-03-07 | Stop reason: HOSPADM

## 2021-03-07 NOTE — ED NOTES
Patient presents to ED with reports of chest pressure. She states it started two hours ago. She reports a history of this before but has not seen a doctor for it. She has seen Dr. Stephens in the past. Denies stents or surgery.      Harika Red, RN  03/07/21 0300

## 2021-03-08 NOTE — DISCHARGE INSTRUCTIONS
Follow-up with your primary doctor and cardiologist.  Return to the emergency room for any new or worsening symptoms or if you have any other questions or concerns.

## 2021-03-08 NOTE — ED PROVIDER NOTES
Subjective   Chief complaint: Chest pain    70-year-old female presents with chest pain.  Patient states the pain started this evening and has been constant.  The pain is in the left side of the chest without radiation.  She describes it as a heaviness on her chest.  She has had some shortness of breath.  She states she is actually been getting more short of breath over the past 2 months especially with exertion.  She states this is new for her.  She denies any cough or fever.  She denies any diaphoresis, dizziness, palpitations, nausea.  She denies any alleviating or exacerbating factors.  Review of records shows patient did have a stress test about a year ago which was unremarkable however the cardiologist did recommend heart catheterization if she had any further symptoms.      History provided by:  Patient      Review of Systems   Constitutional: Negative for fever.   HENT: Negative for congestion and sore throat.    Eyes: Negative for redness.   Respiratory: Positive for shortness of breath. Negative for cough.    Cardiovascular: Positive for chest pain.   Gastrointestinal: Negative for abdominal pain, diarrhea and vomiting.   Genitourinary: Negative for dysuria.   Musculoskeletal: Negative for back pain.   Skin: Negative for rash.   Neurological: Negative for dizziness and headaches.   Psychiatric/Behavioral: Negative for confusion.       Past Medical History:   Diagnosis Date   • Anxiety    • Environmental and seasonal allergies    • GERD (gastroesophageal reflux disease)    • Migraine    • Obesity    • Seizures (CMS/HCC)    • Sleep apnea    • Tobacco dependency        Allergies   Allergen Reactions   • Penicillins Itching       Past Surgical History:   Procedure Laterality Date   • GALLBLADDER SURGERY     • TUBAL ABDOMINAL LIGATION         Family History   Problem Relation Age of Onset   • Hypertension Mother    • Cancer Sister    • Diabetes Brother    • Heart attack Nephew    • Diabetes Niece        Social  "History     Socioeconomic History   • Marital status:      Spouse name: Not on file   • Number of children: Not on file   • Years of education: Not on file   • Highest education level: Not on file   Tobacco Use   • Smoking status: Former Smoker     Packs/day: 2.00     Years: 30.00     Pack years: 60.00     Types: Cigarettes     Quit date: 2020     Years since quittin.1   • Smokeless tobacco: Never Used   Substance and Sexual Activity   • Alcohol use: No   • Drug use: Never   • Sexual activity: Defer       /82   Pulse 83   Temp 97.9 °F (36.6 °C) (Oral)   Resp 16   Ht 152.4 cm (60\")   Wt 107 kg (235 lb 0.2 oz)   SpO2 98%   Breastfeeding No   BMI 45.90 kg/m²       Objective   Physical Exam  Vitals and nursing note reviewed.   Constitutional:       Appearance: She is well-developed.   HENT:      Head: Normocephalic and atraumatic.   Eyes:      Extraocular Movements: Extraocular movements intact.      Pupils: Pupils are equal, round, and reactive to light.   Cardiovascular:      Rate and Rhythm: Normal rate and regular rhythm.      Heart sounds: Normal heart sounds.   Pulmonary:      Effort: Pulmonary effort is normal. No respiratory distress.      Breath sounds: Normal breath sounds.   Abdominal:      General: Bowel sounds are normal.      Palpations: Abdomen is soft.      Tenderness: There is no abdominal tenderness.   Musculoskeletal:         General: Normal range of motion.      Cervical back: Normal range of motion and neck supple.   Skin:     General: Skin is warm and dry.   Neurological:      General: No focal deficit present.      Mental Status: She is alert and oriented to person, place, and time.         Procedures           ED Course      My interpretation of EKG shows sinus rhythm, rate of 81, no STEMI     Results for orders placed or performed during the hospital encounter of 21   Basic Metabolic Panel    Specimen: Blood   Result Value Ref Range    Glucose 86 65 - 99 " mg/dL    BUN 9 8 - 23 mg/dL    Creatinine 0.91 0.57 - 1.00 mg/dL    Sodium 143 136 - 145 mmol/L    Potassium 3.6 3.5 - 5.2 mmol/L    Chloride 107 98 - 107 mmol/L    CO2 23.0 22.0 - 29.0 mmol/L    Calcium 9.8 8.6 - 10.5 mg/dL    eGFR  African Amer 74 >60 mL/min/1.73    BUN/Creatinine Ratio 9.9 7.0 - 25.0    Anion Gap 13.0 5.0 - 15.0 mmol/L   Protime-INR    Specimen: Blood   Result Value Ref Range    Protime 11.1 9.6 - 11.7 Seconds    INR 1.01 0.93 - 1.10   aPTT    Specimen: Blood   Result Value Ref Range    PTT 25.1 24.0 - 31.0 seconds   Troponin    Specimen: Blood   Result Value Ref Range    Troponin T <0.010 0.000 - 0.030 ng/mL   BNP    Specimen: Blood   Result Value Ref Range    proBNP 437.1 0.0 - 900.0 pg/mL   CBC Auto Differential    Specimen: Blood   Result Value Ref Range    WBC 5.00 3.40 - 10.80 10*3/mm3    RBC 4.00 3.77 - 5.28 10*6/mm3    Hemoglobin 11.8 (L) 12.0 - 15.9 g/dL    Hematocrit 34.2 34.0 - 46.6 %    MCV 85.5 79.0 - 97.0 fL    MCH 29.5 26.6 - 33.0 pg    MCHC 34.4 31.5 - 35.7 g/dL    RDW 12.9 12.3 - 15.4 %    RDW-SD 39.8 37.0 - 54.0 fl    MPV 7.3 6.0 - 12.0 fL    Platelets 254 140 - 450 10*3/mm3    Neutrophil % 35.8 (L) 42.7 - 76.0 %    Lymphocyte % 50.5 (H) 19.6 - 45.3 %    Monocyte % 10.8 5.0 - 12.0 %    Eosinophil % 2.2 0.3 - 6.2 %    Basophil % 0.7 0.0 - 1.5 %    Neutrophils, Absolute 1.80 1.70 - 7.00 10*3/mm3    Lymphocytes, Absolute 2.50 0.70 - 3.10 10*3/mm3    Monocytes, Absolute 0.50 0.10 - 0.90 10*3/mm3    Eosinophils, Absolute 0.10 0.00 - 0.40 10*3/mm3    Basophils, Absolute 0.00 0.00 - 0.20 10*3/mm3    nRBC 0.4 (H) 0.0 - 0.2 /100 WBC   ECG 12 Lead   Result Value Ref Range    QT Interval 392 ms   Gold Top - SST   Result Value Ref Range    Extra Tube Hold for add-ons.      XR Chest 1 View    Result Date: 3/7/2021  No acute cardiopulmonary abnormality.  Electronically Signed By-Jason Lopez MD On:3/7/2021 7:52 PM This report was finalized on [id number] by  Jason Lopez MD.                                     MDM   Patient had the above evaluation.  Results were discussed with the patient.  Patient remained well-appearing in the emergency room.  Chest x-ray shows no acute disease.  EKG shows no acute ischemia.  Troponin is negative.  I had a long discussion with the patient.  I recommended that she be admitted to the hospital for further evaluation.  She states she has an appointment with her cardiologist next week and she would just like to be discharged to follow-up with him.  We discussed the risks of not completing her work-up up to and including heart attack and death.  She voices understanding.  She will follow-up with her cardiologist in the office and return for any new or worsening symptoms.      Final diagnoses:   Chest pain, unspecified type            Evangelista Lehman MD  03/07/21 0111

## 2021-03-09 LAB — QT INTERVAL: 392 MS

## 2021-03-11 ENCOUNTER — OFFICE VISIT (OUTPATIENT)
Dept: CARDIOLOGY | Facility: CLINIC | Age: 71
End: 2021-03-11

## 2021-03-11 VITALS
SYSTOLIC BLOOD PRESSURE: 158 MMHG | OXYGEN SATURATION: 100 % | HEART RATE: 82 BPM | WEIGHT: 234 LBS | TEMPERATURE: 98.2 F | DIASTOLIC BLOOD PRESSURE: 82 MMHG | BODY MASS INDEX: 45.94 KG/M2 | HEIGHT: 60 IN

## 2021-03-11 DIAGNOSIS — I10 ESSENTIAL HYPERTENSION: ICD-10-CM

## 2021-03-11 DIAGNOSIS — R06.02 SHORTNESS OF BREATH: ICD-10-CM

## 2021-03-11 DIAGNOSIS — R07.89 CHEST PRESSURE: Primary | ICD-10-CM

## 2021-03-11 PROCEDURE — 99214 OFFICE O/P EST MOD 30 MIN: CPT | Performed by: INTERNAL MEDICINE

## 2021-03-11 NOTE — PROGRESS NOTES
Encounter Date:03/11/2021  Last seen 8/6/2020      Patient ID: Stefania Marie is a 70 y.o. female.    Chief Complaint:  Chest discomfort-new problem  Shortness of breath-new problem  Hypertension  GERD  Sleep apnea     History of Present Illness  Patient recently has been having increased shortness of breath and chest tightness with exertion left precordial associated with sweating.  No other associated aggravating or elevating factors.  Patient went to the emergency room on 3/7/2021 and patient was advised admission but patient declined and went home.    Patient is not having any palpitations, dizziness or syncope.  Denies having any headache ,abdominal pain ,nausea, vomiting , diarrhea constipation, loss of weight or loss of appetite.  Denies having any excessive bruising ,hematuria or blood in the stool.    Review of all systems negative except as indicated.    Reviewed ROS.  Assessment and Plan         ]]]]]]]]]]]]]]]]]]  Impression  ==========  -Exertional shortness of breath and chest tightness associated with sweating suggestive of angina pectoris     Echocardiogram 2/4/2020  · Left ventricular systolic function is normal.  · Estimated EF = 55%.  · Left ventricular diastolic dysfunction (grade I) consistent with impaired relaxation.  · Mild tricuspid valve regurgitation is present.  Calculated right ventricular systolic pressure from tricuspid regurgitation is 53.2 mmHg.      Lexiscan Cardiolite test 2/4/2020  · myocardial perfusion imaging indicates a normal myocardial perfusion study with no evidence of ischemia.  · Impressions are consistent with a low risk study.  · This is normal Cardiolite imaging stress test with no evidence of ischemia or myocardiDiaphragmatic attenuation artifact is present.  · Left ventricular ejection fraction is normal (Calculated EF = 58%).  · Mal infarction. There is mild fixed defect of inferior/septal wall was noted which showed normal thickening and contact LAD consistent  with attenuation artifact. Clinical correlation recommended. Further recommendation as per ordering physician..     -Hypertension history of seizure disorder sleep apnea GERD     -Exogenous obesity     -Status post cholecystectomy tubal ligation     -Family history of coronary artery disease     -Former smoker.  Stopped smoking in January 2020     -Allergic to penicillin.  ============  Plan  ========  Recent exertional chest discomfort and shortness of breath-suggestive of angina pectoris.  Recent ER visit  Stress Cardiolite test  Echocardiogram    Hypertension-controlled-158/82.  Continue metoprolol.    Recent EKG showed sinus rhythm without any ischemic changes-reviewed from the emergency room independently and summarized.  Recent labs reviewed and summarized as below.    Medications were reviewed and updated.  Follow-up in the office on the same day.  Consideration for cardiac catheterization depending on the results of the testing.  Further plan will depend on patient's progress.  [[[[[[[[[[[[[[[[[[[                          Diagnosis Plan   1. Chest pressure  Adult Transthoracic Echo Complete W/ Cont if Necessary Per Protocol    Stress Test With Myocardial Perfusion One Day   2. Shortness of breath  Adult Transthoracic Echo Complete W/ Cont if Necessary Per Protocol    Stress Test With Myocardial Perfusion One Day   3. Essential hypertension  Adult Transthoracic Echo Complete W/ Cont if Necessary Per Protocol    Stress Test With Myocardial Perfusion One Day   LAB RESULTS (LAST 7 DAYS)    CBC  Results from last 7 days   Lab Units 03/07/21  1929   WBC 10*3/mm3 5.00   RBC 10*6/mm3 4.00   HEMOGLOBIN g/dL 11.8*   HEMATOCRIT % 34.2   MCV fL 85.5   PLATELETS 10*3/mm3 254       BMP  Results from last 7 days   Lab Units 03/07/21  1929   SODIUM mmol/L 143   POTASSIUM mmol/L 3.6   CHLORIDE mmol/L 107   CO2 mmol/L 23.0   BUN mg/dL 9   CREATININE mg/dL 0.91   GLUCOSE mg/dL 86       CMP   Results from last 7 days   Lab  Units 03/07/21 1929   SODIUM mmol/L 143   POTASSIUM mmol/L 3.6   CHLORIDE mmol/L 107   CO2 mmol/L 23.0   BUN mg/dL 9   CREATININE mg/dL 0.91   GLUCOSE mg/dL 86         BNP        TROPONIN  Results from last 7 days   Lab Units 03/07/21 1929   TROPONIN T ng/mL <0.010       CoAg  Results from last 7 days   Lab Units 03/07/21 1929   INR  1.01   APTT seconds 25.1       Creatinine Clearance  Estimated Creatinine Clearance: 64.2 mL/min (by C-G formula based on SCr of 0.91 mg/dL).    ABG        Radiology  No radiology results for the last day                The following portions of the patient's history were reviewed and updated as appropriate: allergies, current medications, past family history, past medical history, past social history, past surgical history and problem list.    Review of Systems   Constitutional: Negative for malaise/fatigue.   Cardiovascular: Positive for chest pain (heaviness ). Negative for leg swelling, palpitations and syncope.   Respiratory: Positive for shortness of breath.    Skin: Negative for rash.   Gastrointestinal: Negative for nausea and vomiting.   Neurological: Negative for dizziness, light-headedness and numbness.         Current Outpatient Medications:   •  aspirin (ADULT ASPIRIN EC LOW STRENGTH) 81 MG EC tablet, , Disp: , Rfl:   •  baclofen (LIORESAL) 10 MG tablet, TK 1 T PO TID PRN, Disp: , Rfl:   •  busPIRone (BUSPAR) 10 MG tablet, Take 10 mg by mouth 4 (Four) Times a Day., Disp: , Rfl:   •  Calcium Carb-Cholecalciferol 1000-800 MG-UNIT tablet, CALTRATE 600+D3 TABS, Disp: , Rfl:   •  diclofenac (VOLTAREN) 50 MG EC tablet, TAKE 1 TABLET BY MOUTH AS NEEDED FOR HEADACHE, MAY REPEAT IN 4 HOURS ONCE, Disp: 180 tablet, Rfl: 0  •  EPINEPHrine (EPIPEN) 0.3 MG/0.3ML solution auto-injector injection, INJ UTD, Disp: , Rfl:   •  fluticasone (FLONASE) 50 MCG/ACT nasal spray, , Disp: , Rfl:   •  gabapentin (NEURONTIN) 400 MG capsule, TAKE 1 CAPSULE BY MOUTH THREE TIMES DAILY, Disp: 270  capsule, Rfl: 0  •  levETIRAcetam (KEPPRA) 500 MG tablet, Take 1.5 tablets by mouth 2 (Two) Times a Day., Disp: 180 tablet, Rfl: 3  •  meclizine (ANTIVERT) 25 MG tablet, Take 1 tablet by mouth 3 (Three) Times a Day As Needed for Dizziness., Disp: 15 tablet, Rfl: 0  •  methIMAzole (TAPAZOLE) 10 MG tablet, Take 10 mg by mouth 2 (Two) Times a Day., Disp: , Rfl:   •  metoprolol succinate XL (TOPROL-XL) 25 MG 24 hr tablet, Take 1 tablet by mouth Daily., Disp: 30 tablet, Rfl: 0  •  montelukast (SINGULAIR) 10 MG tablet, Take 10 mg by mouth Every Night., Disp: , Rfl: 3  •  Multiple Vitamins-Minerals (CENTRUM ADULTS PO), Take 1 tablet by mouth Daily., Disp: , Rfl:   •  omeprazole (priLOSEC) 40 MG capsule, TK ONE C PO  ONCE D, Disp: , Rfl: 4  •  orlistat (XENICAL) 120 MG capsule, Take 120 mg by mouth Daily., Disp: , Rfl:   •  PARoxetine (PAXIL) 20 MG tablet, Take 20 mg by mouth Every Morning., Disp: , Rfl:   •  potassium chloride (K-DUR,KLOR-CON) 20 MEQ CR tablet, Take 20 mEq by mouth 3 (Three) Times a Day., Disp: , Rfl:   •  SUMAtriptan (IMITREX) 100 MG tablet, Take 100 mg by mouth Every 2 (Two) Hours As Needed for Migraine. Take one tablet at onset of headache. May repeat dose one time in 2 hours if headache not relieved., Disp: , Rfl:   •  topiramate (TOPAMAX) 100 MG tablet, Take 1 tablet by mouth 2 (Two) Times a Day., Disp: 180 tablet, Rfl: 3  •  traZODone (DESYREL) 50 MG tablet, TAKE 1 TABLET BY MOUTH DAILY AT BEDTIME, Disp: 90 tablet, Rfl: 0    Allergies   Allergen Reactions   • Penicillins Itching       Family History   Problem Relation Age of Onset   • Hypertension Mother    • Cancer Sister    • Diabetes Brother    • Heart attack Nephew    • Diabetes Niece        Past Surgical History:   Procedure Laterality Date   • GALLBLADDER SURGERY     • TUBAL ABDOMINAL LIGATION         Past Medical History:   Diagnosis Date   • Anxiety    • Environmental and seasonal allergies    • GERD (gastroesophageal reflux disease)    •  "Migraine    • Obesity    • Seizures (CMS/HCC)    • Sleep apnea    • Tobacco dependency        Family History   Problem Relation Age of Onset   • Hypertension Mother    • Cancer Sister    • Diabetes Brother    • Heart attack Nephew    • Diabetes Niece        Social History     Socioeconomic History   • Marital status:      Spouse name: Not on file   • Number of children: Not on file   • Years of education: Not on file   • Highest education level: Not on file   Tobacco Use   • Smoking status: Former Smoker     Packs/day: 2.00     Years: 30.00     Pack years: 60.00     Types: Cigarettes     Quit date: 2020     Years since quittin.1   • Smokeless tobacco: Never Used   Vaping Use   • Vaping Use: Never used   Substance and Sexual Activity   • Alcohol use: No   • Drug use: Never   • Sexual activity: Defer         Procedures      Objective:       Physical Exam    /82 (BP Location: Left arm, Patient Position: Sitting, Cuff Size: Large Adult) Comment (BP Location): lower arm  Pulse 82   Temp 98.2 °F (36.8 °C)   Ht 152.4 cm (60\")   Wt 106 kg (234 lb)   SpO2 100%   BMI 45.70 kg/m²   The patient is alert, oriented and in no distress.    Vital signs as noted above.  Exogenous obesity.    Head and neck revealed no carotid bruits or jugular venous distension.  No thyromegaly or lymphadenopathy is present.    Lungs clear.  No wheezing.  Breath sounds are normal bilaterally.    Heart normal first and second heart sounds.  No murmur..  No pericardial rub is present.  No gallop is present.    Abdomen soft and nontender.  No organomegaly is present.    Extremities revealed good peripheral pulses without any pedal edema.    Skin warm and dry.    Musculoskeletal system is grossly normal.    CNS grossly normal.    Reviewed and unchanged from last visit.        "

## 2021-03-12 ENCOUNTER — TELEPHONE (OUTPATIENT)
Dept: CARDIOLOGY | Facility: CLINIC | Age: 71
End: 2021-03-12

## 2021-03-26 NOTE — PROGRESS NOTES
"Chief Complaint  Sleep Apnea    Subjective          Stefania Marie presents to [facility] NEUROLOGY  History of Present Illness     ALEX, f/u , patient doing well with pap therapy and uses nasal pillows and through [facility] for supplies.       Sleep testing history:    On NPSG at [facility] , 03/18/2014 patient had Severe obstructive sleep apnea syndrome with apnea-hypopnea index of 41.9 per sleep hour, minimum SpO2 of 79%    Pt  Has CPAP machine. Uses on regular basis.     PAP download:  Data not available today     The patient's hypersomnia has improved       [place] Sleepiness Scale:  Sitting and reading 0 WatchingTV 0  Sitting, inactive, in a public place 0  As a passenger in a car for 1 hour w/o a break  0  Lying down to rest in the afternoon  0  Sitting and talking to someone  0  Sitting quietly after a lunch  0  In a car, while stopped for traffic or a light  0  Total 0    Headaches are improved, takes imitrex prn    No seizures, on keppra    Review of Systems   Constitutional: Negative for chills and diaphoresis.   HENT: Negative for drooling and ear discharge.    Eyes: Negative for pain and redness.   Respiratory: Negative for choking and chest tightness.    Cardiovascular: Positive for chest pain. Negative for leg swelling.   Gastrointestinal: Negative for blood in stool and constipation.   Endocrine: Negative for cold intolerance and heat intolerance.   Genitourinary: Negative for flank pain and frequency.   Musculoskeletal: Negative for arthralgias and back pain.   Skin: Negative.    Allergic/Immunologic: Negative for environmental allergies and food allergies.   Neurological: Negative for light-headedness and headaches.   Hematological: Negative for adenopathy. Does not bruise/bleed easily.   Psychiatric/Behavioral: Negative for agitation and behavioral problems.     Objective   Vital Signs:   /70   Pulse 77   Resp 17   Ht 152.4 cm (60\")   Wt 104 kg (230 lb)   BMI 44.92 kg/m²   "   Physical Exam  Vitals reviewed.   Neurological:      General: No focal deficit present.   Psychiatric:         Mood and Affect: Mood normal.         Behavior: Behavior normal.        Result Review :               Assessment and Plan    Diagnoses and all orders for this visit:    1. Sleep apnea, unspecified type (Primary)    2. Partial symptomatic epilepsy with complex partial seizures, not intractable, without status epilepticus (CMS/HCC)    3. Morbid obesity (CMS/HCC)    Other orders  -     levETIRAcetam (KEPPRA) 500 MG tablet; Take 1.5 tablets by mouth 2 (Two) Times a Day.  Dispense: 180 tablet; Refill: 3  -     topiramate (TOPAMAX) 100 MG tablet; Take 1 tablet by mouth 2 (Two) Times a Day.  Dispense: 180 tablet; Refill: 3      Continue Keppra and Imitrex    Order sent for the foam filter, to [facility] when available      The patient is compliant with and benefiting from PAP therapy.      Follow Up   Return in about 1 year (around 3/30/2022).  Patient was given instructions and counseling regarding her condition or for health maintenance advice. Please see specific information pulled into the AVS if appropriate.

## 2021-03-30 ENCOUNTER — OFFICE VISIT (OUTPATIENT)
Dept: NEUROLOGY | Facility: CLINIC | Age: 71
End: 2021-03-30

## 2021-03-30 VITALS
HEIGHT: 60 IN | RESPIRATION RATE: 17 BRPM | BODY MASS INDEX: 45.16 KG/M2 | SYSTOLIC BLOOD PRESSURE: 147 MMHG | DIASTOLIC BLOOD PRESSURE: 70 MMHG | HEART RATE: 77 BPM | WEIGHT: 230 LBS

## 2021-03-30 DIAGNOSIS — G47.30 SLEEP APNEA, UNSPECIFIED TYPE: Primary | Chronic | ICD-10-CM

## 2021-03-30 DIAGNOSIS — E66.01 MORBID OBESITY (HCC): Chronic | ICD-10-CM

## 2021-03-30 DIAGNOSIS — G40.209 PARTIAL SYMPTOMATIC EPILEPSY WITH COMPLEX PARTIAL SEIZURES, NOT INTRACTABLE, WITHOUT STATUS EPILEPTICUS (HCC): Chronic | ICD-10-CM

## 2021-03-30 PROCEDURE — 99214 OFFICE O/P EST MOD 30 MIN: CPT | Performed by: PSYCHIATRY & NEUROLOGY

## 2021-03-30 RX ORDER — TOPIRAMATE 100 MG/1
100 TABLET, FILM COATED ORAL 2 TIMES DAILY
Qty: 180 TABLET | Refills: 3 | Status: SHIPPED | OUTPATIENT
Start: 2021-03-30 | End: 2022-03-07

## 2021-03-30 RX ORDER — LEVOTHYROXINE SODIUM 0.1 MG/1
100 TABLET ORAL DAILY
COMMUNITY

## 2021-03-30 RX ORDER — LEVETIRACETAM 500 MG/1
750 TABLET ORAL 2 TIMES DAILY
Qty: 180 TABLET | Refills: 3 | Status: SHIPPED | OUTPATIENT
Start: 2021-03-30 | End: 2021-07-19

## 2021-03-31 RX ORDER — TRAZODONE HYDROCHLORIDE 50 MG/1
TABLET ORAL
Qty: 90 TABLET | Refills: 0 | Status: SHIPPED | OUTPATIENT
Start: 2021-03-31 | End: 2021-06-29

## 2021-03-31 RX ORDER — GABAPENTIN 400 MG/1
400 CAPSULE ORAL 3 TIMES DAILY
Qty: 270 CAPSULE | Refills: 0 | Status: SHIPPED | OUTPATIENT
Start: 2021-03-31 | End: 2021-09-17

## 2021-04-01 ENCOUNTER — APPOINTMENT (OUTPATIENT)
Dept: CARDIOLOGY | Facility: HOSPITAL | Age: 71
End: 2021-04-01

## 2021-06-29 RX ORDER — TRAZODONE HYDROCHLORIDE 50 MG/1
TABLET ORAL
Qty: 90 TABLET | Refills: 0 | Status: SHIPPED | OUTPATIENT
Start: 2021-06-29 | End: 2021-09-16

## 2021-07-19 RX ORDER — LEVETIRACETAM 500 MG/1
TABLET ORAL
Qty: 180 TABLET | Refills: 3 | Status: SHIPPED | OUTPATIENT
Start: 2021-07-19 | End: 2022-05-09

## 2021-09-16 RX ORDER — TRAZODONE HYDROCHLORIDE 50 MG/1
TABLET ORAL
Qty: 90 TABLET | Refills: 0 | Status: SHIPPED | OUTPATIENT
Start: 2021-09-16 | End: 2021-12-15

## 2021-09-17 RX ORDER — GABAPENTIN 400 MG/1
CAPSULE ORAL
Qty: 270 CAPSULE | Refills: 0 | Status: SHIPPED | OUTPATIENT
Start: 2021-09-17 | End: 2021-12-15

## 2021-11-17 ENCOUNTER — TELEPHONE (OUTPATIENT)
Dept: NEUROLOGY | Facility: CLINIC | Age: 71
End: 2021-11-17

## 2021-11-17 NOTE — TELEPHONE ENCOUNTER
"SEIPEL JOYCE    SELF    [phone]    REGISTERED MACHINE. SAYS IT'S A VERY NEW MACHINE & THAT BRIT TOLD HER THEYD SEND HER A REPLACEMENT. SHOULD SHE CONTINUE TO USE MACHINE IN MEANTIME? SHE SAYS SHE'D RATHER NOT BECAUSE SHE CAN \"BARELY BREATHE\" AND IT'S UNCOMFORTABLE    I SCHEDULED NEXT AVAIL FOLLOW UP FOR SEIZURE 3-16 AND ADDED TO WAITLIST. SHE WAS LAST SEEN FOR SEIZURE 5-.    I LET HER KNOW WE'D SCHEDULE HER A COMPLIANCE VISIT AFTER SHE RECEIVES HER NEW MACHINE  "

## 2021-11-17 NOTE — TELEPHONE ENCOUNTER
Caller:  GLEN    Relationship: SELF    Best call back number: [phone]    Can the office call and leave a detailed voicemail regarding the call: [x]Yes []No    Do you have an active MyChart: []Yes [x]No     Have you registered the machine with [employer]: []Yes [x]No    If NO, you will need to register the machine first, once you have it registered you can give us a call back.      [employer] phone number: [phone]   [employer] website: [email or web address]

## 2021-11-23 NOTE — TELEPHONE ENCOUNTER
Spoke with pt, she will get better fitting mask and continue use of pap. When she gets new pap she wants to come in and have me show her were filters go and how to work it. She is to call me as soon as she gets it.

## 2021-12-15 RX ORDER — GABAPENTIN 400 MG/1
CAPSULE ORAL
Qty: 270 CAPSULE | Refills: 0 | Status: SHIPPED | OUTPATIENT
Start: 2021-12-15 | End: 2022-01-21

## 2021-12-15 RX ORDER — TRAZODONE HYDROCHLORIDE 50 MG/1
TABLET ORAL
Qty: 90 TABLET | Refills: 0 | Status: SHIPPED | OUTPATIENT
Start: 2021-12-15 | End: 2022-03-07

## 2022-01-21 RX ORDER — GABAPENTIN 400 MG/1
CAPSULE ORAL
Qty: 270 CAPSULE | Refills: 0 | Status: SHIPPED | OUTPATIENT
Start: 2022-01-21 | End: 2022-05-16

## 2022-02-07 ENCOUNTER — TELEPHONE (OUTPATIENT)
Dept: NEUROLOGY | Facility: CLINIC | Age: 72
End: 2022-02-07

## 2022-02-07 NOTE — TELEPHONE ENCOUNTER
"PATIENT LEFT MESSAGE WITH ON CALL SERVICE 2-7-22 @ 4:04PM    \"NEEDING TO SPEAK TO THE OFFICE ABOUT CPAP MACHINE.\"    PHONE [phone]  "

## 2022-03-07 RX ORDER — TOPIRAMATE 100 MG/1
TABLET, FILM COATED ORAL
Qty: 180 TABLET | Refills: 3 | Status: SHIPPED | OUTPATIENT
Start: 2022-03-07 | End: 2022-12-08 | Stop reason: SDUPTHER

## 2022-03-07 RX ORDER — TRAZODONE HYDROCHLORIDE 50 MG/1
TABLET ORAL
Qty: 90 TABLET | Refills: 0 | Status: SHIPPED | OUTPATIENT
Start: 2022-03-07 | End: 2022-06-06

## 2022-05-09 RX ORDER — LEVETIRACETAM 500 MG/1
TABLET ORAL
Qty: 180 TABLET | Refills: 3 | Status: SHIPPED | OUTPATIENT
Start: 2022-05-09 | End: 2022-12-08

## 2022-05-16 RX ORDER — GABAPENTIN 400 MG/1
CAPSULE ORAL
Qty: 270 CAPSULE | Refills: 0 | Status: SHIPPED | OUTPATIENT
Start: 2022-05-16

## 2022-06-06 RX ORDER — TRAZODONE HYDROCHLORIDE 50 MG/1
TABLET ORAL
Qty: 90 TABLET | Refills: 0 | Status: SHIPPED | OUTPATIENT
Start: 2022-06-06 | End: 2022-08-31

## 2022-08-31 RX ORDER — TRAZODONE HYDROCHLORIDE 50 MG/1
TABLET ORAL
Qty: 90 TABLET | Refills: 0 | Status: SHIPPED | OUTPATIENT
Start: 2022-08-31 | End: 2022-11-16

## 2022-11-16 RX ORDER — TRAZODONE HYDROCHLORIDE 50 MG/1
TABLET ORAL
Qty: 90 TABLET | Refills: 0 | Status: SHIPPED | OUTPATIENT
Start: 2022-11-16 | End: 2023-02-14

## 2022-12-05 NOTE — PROGRESS NOTES
"Chief Complaint  Follow-up (ALEX AND SEIZURES)    Subjective          Stefania Marie presents to [facility] NEUROLOGY  History of Present Illness  ALEX, f/u , patient states she stopped using recalled cpap due to she kept receiving letters, nasal pillows and through [facility] for supplies.     Sleep testing history:    On NPSG at [facility] , 03/18/2014 patient had Severe obstructive sleep apnea syndrome with apnea-hypopnea index of 41.9 per sleep hour, minimum SpO2 of 79%  PAP download:  Will review after pt using replacement machine for a month or so.     Seizures- last seizure was yrs ago currently taking keppra 500 mg 1.5 tabs bid, patient sates she has not taken the 1/2 tab in about 6 months due to trouble swallowing      Pt has an occ slight ha none severe is a long time      Review of Systems   HENT: Positive for dental problem and trouble swallowing.    Respiratory: Positive for apnea.    All other systems reviewed and are negative.        Objective   Vital Signs:   /91   Pulse 80   Temp 97.1 °F (36.2 °C) (Infrared)   Resp 18   Ht 152.4 cm (60\")   Wt 97.1 kg (214 lb)   BMI 41.79 kg/m²     Physical Exam  Vitals reviewed.   Pulmonary:      Effort: Pulmonary effort is normal. No respiratory distress.   Neurological:      General: No focal deficit present.      Mental Status: She is alert.        Result Review :                 Assessment and Plan    Diagnoses and all orders for this visit:    1. Partial symptomatic epilepsy with complex partial seizures, not intractable, without status epilepticus (HCC) (Primary)    2. Sleep apnea, unspecified type    3. Migraine without aura and without status migrainosus, not intractable    Other orders  -     levETIRAcetam (KEPPRA) 500 MG tablet; Take 1.5 tablets by mouth 2 (Two) Times a Day.  Dispense: 270 tablet; Refill: 3  -     topiramate (TOPAMAX) 100 MG tablet; One per day for one month then discontinue        The patient is compliant with and " benefiting from PAP therapy.    Continue Keppra for the seizures    Dc Topamax as may be effecting memory      Follow Up   Return in about 1 year (around 12/8/2023).    Patient was given instructions and counseling regarding her condition or for health maintenance advice. Please see specific information pulled into the AVS if appropriate.       This document has been electronically signed by Joseph Seipel, MD on December 8, 2022 08:50 EST

## 2022-12-08 ENCOUNTER — OFFICE VISIT (OUTPATIENT)
Dept: NEUROLOGY | Facility: CLINIC | Age: 72
End: 2022-12-08

## 2022-12-08 VITALS
SYSTOLIC BLOOD PRESSURE: 145 MMHG | HEIGHT: 60 IN | BODY MASS INDEX: 42.01 KG/M2 | TEMPERATURE: 97.1 F | HEART RATE: 80 BPM | RESPIRATION RATE: 18 BRPM | DIASTOLIC BLOOD PRESSURE: 91 MMHG | WEIGHT: 214 LBS

## 2022-12-08 DIAGNOSIS — G40.209 PARTIAL SYMPTOMATIC EPILEPSY WITH COMPLEX PARTIAL SEIZURES, NOT INTRACTABLE, WITHOUT STATUS EPILEPTICUS: Primary | Chronic | ICD-10-CM

## 2022-12-08 DIAGNOSIS — G43.009 MIGRAINE WITHOUT AURA AND WITHOUT STATUS MIGRAINOSUS, NOT INTRACTABLE: Chronic | ICD-10-CM

## 2022-12-08 DIAGNOSIS — G47.30 SLEEP APNEA, UNSPECIFIED TYPE: Chronic | ICD-10-CM

## 2022-12-08 PROCEDURE — 99214 OFFICE O/P EST MOD 30 MIN: CPT | Performed by: PSYCHIATRY & NEUROLOGY

## 2022-12-08 RX ORDER — LEVOTHYROXINE SODIUM 100 UG/1
CAPSULE ORAL
COMMUNITY

## 2022-12-08 RX ORDER — TOPIRAMATE 100 MG/1
TABLET, FILM COATED ORAL
Start: 2022-12-08 | End: 2023-02-14

## 2022-12-08 RX ORDER — OXYBUTYNIN CHLORIDE 5 MG/1
TABLET ORAL
COMMUNITY
Start: 2022-11-16

## 2022-12-08 RX ORDER — LEVETIRACETAM 500 MG/1
750 TABLET ORAL 2 TIMES DAILY
Qty: 270 TABLET | Refills: 3 | Status: SHIPPED | OUTPATIENT
Start: 2022-12-08

## 2022-12-12 ENCOUNTER — LAB REQUISITION (OUTPATIENT)
Dept: LAB | Facility: HOSPITAL | Age: 72
End: 2022-12-12

## 2022-12-12 DIAGNOSIS — R13.10 DYSPHAGIA, UNSPECIFIED: ICD-10-CM

## 2022-12-12 PROCEDURE — 88305 TISSUE EXAM BY PATHOLOGIST: CPT | Performed by: INTERNAL MEDICINE

## 2022-12-13 LAB
LAB AP CASE REPORT: NORMAL
LAB AP DIAGNOSIS COMMENT: NORMAL
PATH REPORT.FINAL DX SPEC: NORMAL
PATH REPORT.GROSS SPEC: NORMAL

## 2023-01-11 ENCOUNTER — TELEPHONE (OUTPATIENT)
Dept: NEUROLOGY | Facility: CLINIC | Age: 73
End: 2023-01-11
Payer: MEDICARE

## 2023-01-11 DIAGNOSIS — G47.33 OBSTRUCTIVE SLEEP APNEA: Primary | ICD-10-CM

## 2023-01-26 ENCOUNTER — TELEPHONE (OUTPATIENT)
Dept: NEUROLOGY | Facility: CLINIC | Age: 73
End: 2023-01-26

## 2023-01-26 NOTE — TELEPHONE ENCOUNTER
That is ok for vitam c, I probably mentioned vitamin d as most people are low on vitamin d take one or 2000units per day

## 2023-01-26 NOTE — TELEPHONE ENCOUNTER
Provider: SEIPEL  Caller: PATIENT  Relationship to Patient: SELF  Phone Number: [phone]  Reason for Call: PATIENT WAS TOLD BY DR. SEIPEL THAT SHE COULD TAKE SOME VITAMIN C.  PATIENT IS WANTING TO KNOW WHAT DOSAGE SHE SHOULD TAKE.  SHE PICKED UP SOME VITAMIN C THAT IS 1,000 MG AND WANTS TO KNOW IF SHE SHOULD TAKE THAT MUCH.  PATIENT ALSO TAKES A DAILY MULTIVITAMIN.     PLEASE REVIEW AND ADVISE     THANK YOU

## 2023-01-31 ENCOUNTER — TELEPHONE (OUTPATIENT)
Dept: NEUROLOGY | Facility: CLINIC | Age: 73
End: 2023-01-31
Payer: MEDICARE

## 2023-01-31 DIAGNOSIS — G47.33 OBSTRUCTIVE SLEEP APNEA: Primary | ICD-10-CM

## 2023-02-14 RX ORDER — TOPIRAMATE 100 MG/1
TABLET, FILM COATED ORAL
Qty: 180 TABLET | Refills: 3 | Status: SHIPPED | OUTPATIENT
Start: 2023-02-14

## 2023-02-14 RX ORDER — TRAZODONE HYDROCHLORIDE 50 MG/1
TABLET ORAL
Qty: 90 TABLET | Refills: 3 | Status: SHIPPED | OUTPATIENT
Start: 2023-02-14

## 2023-06-01 ENCOUNTER — TELEPHONE (OUTPATIENT)
Dept: NEUROLOGY | Facility: CLINIC | Age: 73
End: 2023-06-01

## 2023-06-01 NOTE — TELEPHONE ENCOUNTER
Caller: GLEN  Relationship to Patient: SELF  Phone Number: [phone]    Reason for Call: SHE ALSO STATES PLEASE MAKE SURE THEY ACCEPT HER INSURANCE

## 2023-06-01 NOTE — TELEPHONE ENCOUNTER
Caller: Stefania Marie    Relationship: Self    Best call back number: [phone]    What is the best time to reach you: ANY    Who are you requesting to speak with (clinical staff, provider,  specific staff member): SEIPEL    What was the call regarding: PATIENT TELEPHONED TO REQUEST RECOMMENDATIONS/REFERRAL FOR DENTIST    PLEASE CALL & ADVISE    THANK YOU

## 2023-06-01 NOTE — TELEPHONE ENCOUNTER
I have no basis to make a suggestion regarding where she should go for false teeth.  If I did I would gladly make a recommendation.  I suggest she ask her PCP Dr Gil as he might have some input.  Otherwise I suggest she check with her Dental insurance co and see who is on the list of covered by her insurance

## 2023-07-18 ENCOUNTER — APPOINTMENT (OUTPATIENT)
Dept: GENERAL RADIOLOGY | Facility: HOSPITAL | Age: 73
End: 2023-07-18
Payer: MEDICARE

## 2023-07-18 ENCOUNTER — HOSPITAL ENCOUNTER (EMERGENCY)
Facility: HOSPITAL | Age: 73
Discharge: HOME | End: 2023-07-18
Attending: EMERGENCY MEDICINE | Admitting: EMERGENCY MEDICINE
Payer: MEDICARE

## 2023-07-18 VITALS
BODY MASS INDEX: 43.98 KG/M2 | RESPIRATION RATE: 22 BRPM | OXYGEN SATURATION: 100 % | SYSTOLIC BLOOD PRESSURE: 164 MMHG | HEIGHT: 62 IN | WEIGHT: 238.98 LBS | TEMPERATURE: 98.7 F | HEART RATE: 60 BPM | DIASTOLIC BLOOD PRESSURE: 84 MMHG

## 2023-07-18 DIAGNOSIS — J44.1 COPD EXACERBATION: ICD-10-CM

## 2023-07-18 DIAGNOSIS — R06.00 DYSPNEA, UNSPECIFIED TYPE: Primary | ICD-10-CM

## 2023-07-18 LAB
ALBUMIN SERPL-MCNC: 4.4 G/DL (ref 3.5–5.2)
ALBUMIN/GLOB SERPL: 1.2 G/DL
ALP SERPL-CCNC: 92 U/L (ref 39–117)
ALT SERPL W P-5'-P-CCNC: 17 U/L (ref 1–33)
ANION GAP SERPL CALCULATED.3IONS-SCNC: 12 MMOL/L (ref 5–15)
AST SERPL-CCNC: 23 U/L (ref 1–32)
BASOPHILS # BLD AUTO: 0.1 10*3/MM3 (ref 0–0.2)
BASOPHILS NFR BLD AUTO: 1.2 % (ref 0–1.5)
BILIRUB SERPL-MCNC: 0.3 MG/DL (ref 0–1.2)
BUN SERPL-MCNC: 13 MG/DL (ref 8–23)
BUN/CREAT SERPL: 11.3 (ref 7–25)
CALCIUM SPEC-SCNC: 9.6 MG/DL (ref 8.6–10.5)
CHLORIDE SERPL-SCNC: 107 MMOL/L (ref 98–107)
CO2 SERPL-SCNC: 22 MMOL/L (ref 22–29)
CREAT SERPL-MCNC: 1.15 MG/DL (ref 0.57–1)
DEPRECATED RDW RBC AUTO: 44.2 FL (ref 37–54)
EGFRCR SERPLBLD CKD-EPI 2021: 51 ML/MIN/1.73
EOSINOPHIL # BLD AUTO: 0.2 10*3/MM3 (ref 0–0.4)
EOSINOPHIL NFR BLD AUTO: 4.5 % (ref 0.3–6.2)
ERYTHROCYTE [DISTWIDTH] IN BLOOD BY AUTOMATED COUNT: 14.4 % (ref 12.3–15.4)
GLOBULIN UR ELPH-MCNC: 3.7 GM/DL
GLUCOSE SERPL-MCNC: 96 MG/DL (ref 65–99)
HCT VFR BLD AUTO: 39.9 % (ref 34–46.6)
HGB BLD-MCNC: 13 G/DL (ref 12–15.9)
LYMPHOCYTES # BLD AUTO: 1.6 10*3/MM3 (ref 0.7–3.1)
LYMPHOCYTES NFR BLD AUTO: 32.5 % (ref 19.6–45.3)
MCH RBC QN AUTO: 29.3 PG (ref 26.6–33)
MCHC RBC AUTO-ENTMCNC: 32.7 G/DL (ref 31.5–35.7)
MCV RBC AUTO: 89.6 FL (ref 79–97)
MONOCYTES # BLD AUTO: 0.3 10*3/MM3 (ref 0.1–0.9)
MONOCYTES NFR BLD AUTO: 6.4 % (ref 5–12)
NEUTROPHILS NFR BLD AUTO: 2.7 10*3/MM3 (ref 1.7–7)
NEUTROPHILS NFR BLD AUTO: 55.4 % (ref 42.7–76)
NRBC BLD AUTO-RTO: 0.1 /100 WBC (ref 0–0.2)
NT-PROBNP SERPL-MCNC: 250.7 PG/ML (ref 0–900)
PLATELET # BLD AUTO: 232 10*3/MM3 (ref 140–450)
PMV BLD AUTO: 7.2 FL (ref 6–12)
POTASSIUM SERPL-SCNC: 3.9 MMOL/L (ref 3.5–5.2)
PROT SERPL-MCNC: 8.1 G/DL (ref 6–8.5)
RBC # BLD AUTO: 4.45 10*6/MM3 (ref 3.77–5.28)
SODIUM SERPL-SCNC: 141 MMOL/L (ref 136–145)
TROPONIN T SERPL HS-MCNC: 7 NG/L
WBC NRBC COR # BLD: 4.9 10*3/MM3 (ref 3.4–10.8)

## 2023-07-18 PROCEDURE — 93005 ELECTROCARDIOGRAM TRACING: CPT | Performed by: NURSE PRACTITIONER

## 2023-07-18 PROCEDURE — 99282 EMERGENCY DEPT VISIT SF MDM: CPT

## 2023-07-18 PROCEDURE — 85025 COMPLETE CBC W/AUTO DIFF WBC: CPT | Performed by: NURSE PRACTITIONER

## 2023-07-18 PROCEDURE — 80053 COMPREHEN METABOLIC PANEL: CPT | Performed by: NURSE PRACTITIONER

## 2023-07-18 PROCEDURE — 36415 COLL VENOUS BLD VENIPUNCTURE: CPT

## 2023-07-18 PROCEDURE — 83880 ASSAY OF NATRIURETIC PEPTIDE: CPT | Performed by: NURSE PRACTITIONER

## 2023-07-18 PROCEDURE — 71045 X-RAY EXAM CHEST 1 VIEW: CPT

## 2023-07-18 PROCEDURE — 84484 ASSAY OF TROPONIN QUANT: CPT | Performed by: NURSE PRACTITIONER

## 2023-07-18 RX ORDER — SODIUM CHLORIDE 0.9 % (FLUSH) 0.9 %
10 SYRINGE (ML) INJECTION AS NEEDED
Status: DISCONTINUED | OUTPATIENT
Start: 2023-07-18 | End: 2023-07-18 | Stop reason: HOSPADM

## 2023-07-18 NOTE — DISCHARGE INSTRUCTIONS
Rest, avoid the excessive heat and poor air quality, use your COPD inhalers as needed.  Follow-up with your primary care this week.  Return for increased shortness of breath, fever, chest pain or any other concerns

## 2023-07-18 NOTE — ED PROVIDER NOTES
Subjective     Provider in Triage Note  Patient is a 72-year-old -American female with a history of COPD seizure disorder and GERD who presents today from home with complaints of shortness of breath.  She states she was cleaning her house today moving and tossing objects around when she became acutely short of breath.  She states it got worse and she presents to the ED for evaluation.  She reports a chronic cough, denies chest pain fever chills leg pain or swelling or other complaint at this time.    History of Present Illness  3 with pit note adding patient describes some mild shortness of breath while doing her housework today.  She has a history of COPD.  She reports no increased cough or fever or chest pain.  She reports no leg pain or swelling.  Review of Systems    Past Medical History:   Diagnosis Date    Anxiety     Environmental and seasonal allergies     GERD (gastroesophageal reflux disease)     Migraine     Obesity     Seizures     Sleep apnea     Tobacco dependency        Allergies   Allergen Reactions    Penicillins Itching       Past Surgical History:   Procedure Laterality Date    GALLBLADDER SURGERY      TUBAL ABDOMINAL LIGATION         Family History   Problem Relation Age of Onset    Hypertension Mother     Cancer Sister     Diabetes Brother     Heart attack Nephew     Diabetes Niece        Social History     Socioeconomic History    Marital status:    Tobacco Use    Smoking status: Former     Packs/day: 2.00     Years: 30.00     Pack years: 60.00     Types: Cigarettes     Quit date: 1/13/2020     Years since quitting: 3.5    Smokeless tobacco: Never   Vaping Use    Vaping Use: Never used   Substance and Sexual Activity    Alcohol use: No    Drug use: Never    Sexual activity: Defer       Prior to Admission medications    Medication Sig Start Date End Date Taking? Authorizing Provider   aspirin (ADULT ASPIRIN EC LOW STRENGTH) 81 MG EC tablet  9/21/15   Provider, MD Parvez    baclofen (LIORESAL) 10 MG tablet TK 1 T PO TID PRN 4/15/20   Parvez Wilde MD   busPIRone (BUSPAR) 10 MG tablet Take 10 mg by mouth 4 (Four) Times a Day.    Parvez Wilde MD   Calcium Carb-Cholecalciferol 1000-800 MG-UNIT tablet CALTRATE 600+D3 TABS    Parvez Wilde MD   diclofenac (VOLTAREN) 50 MG EC tablet TAKE 1 TABLET BY MOUTH AS NEEDED FOR HEADACHE, MAY REPEAT IN 4 HOURS ONCE 3/5/20   Seipel, Joseph F, MD   EPINEPHrine (EPIPEN) 0.3 MG/0.3ML solution auto-injector injection INJ UTD 5/19/20   Parvez Wilde MD   fluticasone (FLONASE) 50 MCG/ACT nasal spray  3/17/20   Parvez Wilde MD   gabapentin (NEURONTIN) 400 MG capsule TAKE 1 CAPSULE BY MOUTH THREE TIMES DAILY 5/16/22   Seipel, Joseph F, MD   levETIRAcetam (KEPPRA) 500 MG tablet Take 1.5 tablets by mouth 2 (Two) Times a Day. 12/8/22   Seipel, Joseph F, MD   levothyroxine (SYNTHROID, LEVOTHROID) 100 MCG tablet Take 100 mcg by mouth Daily.    Parvez Wilde MD   levothyroxine sodium (TIROSINT) 100 MCG capsule     Parvez Wilde MD   methIMAzole (TAPAZOLE) 10 MG tablet Take 10 mg by mouth 2 (Two) Times a Day. 4/13/20   Parvez Wilde MD   montelukast (SINGULAIR) 10 MG tablet Take 10 mg by mouth Every Night. 9/4/19   Parvez Wilde MD   Multiple Vitamins-Minerals (CENTRUM ADULTS PO) Take 1 tablet by mouth Daily.    Parvez Wilde MD   omeprazole (priLOSEC) 40 MG capsule TK ONE C PO  ONCE D 10/18/19   Parvez Wilde MD   oxybutynin (DITROPAN) 5 MG tablet TAKE 1 TABLET BY MOUTH TWICE DAILY FOR INCONTINENCY 11/16/22   Parvez Wilde MD   PARoxetine (PAXIL) 20 MG tablet Take 20 mg by mouth Every Morning. 9/24/19   Parvez Wilde MD   potassium chloride (K-DUR,KLOR-CON) 20 MEQ CR tablet Take 20 mEq by mouth 3 (Three) Times a Day. 9/19/19   Parvez Wilde MD   SUMAtriptan (IMITREX) 100 MG tablet Take 100 mg by mouth Every 2 (Two) Hours As Needed for Migraine. Take  "one tablet at onset of headache. May repeat dose one time in 2 hours if headache not relieved.    Provider, MD Parvez   topiramate (TOPAMAX) 100 MG tablet TAKE 1 TABLET BY MOUTH TWICE DAILY 2/14/23   Seipel, Joseph F, MD   traZODone (DESYREL) 50 MG tablet TAKE 1 TABLET BY MOUTH DAILY AT BEDTIME 2/14/23   Seipel, Joseph F, MD     /80 (BP Location: Right arm, Patient Position: Sitting)   Pulse 89   Temp 98.7 °F (37.1 °C) (Oral)   Resp 15   Ht 157.5 cm (62\")   Wt 108 kg (238 lb 15.7 oz)   SpO2 100%   BMI 43.71 kg/m²       Objective   Physical Exam  General: Well-developed well-appearing, no acute distress, alert and appropriate  Eyes:  sclera nonicteric  HEENT: Mucous membranes moist, no mucosal swelling  Neck: Supple, no nuchal rigidity, no JVD or stridor  Respirations: Respirations nonlabored, equal breath sounds bilaterally, clear lungs  Heart regular rate and rhythm, no murmurs rubs or gallops,   Abdomen soft nontender nondistended, no hepatosplenomegaly, no hernia, no mass, normal bowel sounds, no CVA tenderness  Extremities no clubbing cyanosis or edema, calves are symmetric and nontender  Neuro cranial nerves grossly intact, no focal limb deficits  Psych oriented, pleasant affect  Skin no rash, brisk cap refill  Procedures           ED Course      Results for orders placed or performed during the hospital encounter of 07/18/23   Comprehensive Metabolic Panel    Specimen: Blood   Result Value Ref Range    Glucose 96 65 - 99 mg/dL    BUN 13 8 - 23 mg/dL    Creatinine 1.15 (H) 0.57 - 1.00 mg/dL    Sodium 141 136 - 145 mmol/L    Potassium 3.9 3.5 - 5.2 mmol/L    Chloride 107 98 - 107 mmol/L    CO2 22.0 22.0 - 29.0 mmol/L    Calcium 9.6 8.6 - 10.5 mg/dL    Total Protein 8.1 6.0 - 8.5 g/dL    Albumin 4.4 3.5 - 5.2 g/dL    ALT (SGPT) 17 1 - 33 U/L    AST (SGOT) 23 1 - 32 U/L    Alkaline Phosphatase 92 39 - 117 U/L    Total Bilirubin 0.3 0.0 - 1.2 mg/dL    Globulin 3.7 gm/dL    A/G Ratio 1.2 g/dL    " BUN/Creatinine Ratio 11.3 7.0 - 25.0    Anion Gap 12.0 5.0 - 15.0 mmol/L    eGFR 51.0 (L) >60.0 mL/min/1.73   High Sensitivity Troponin T    Specimen: Blood   Result Value Ref Range    HS Troponin T 7 <10 ng/L   BNP    Specimen: Blood   Result Value Ref Range    proBNP 250.7 0.0 - 900.0 pg/mL   CBC Auto Differential    Specimen: Blood   Result Value Ref Range    WBC 4.90 3.40 - 10.80 10*3/mm3    RBC 4.45 3.77 - 5.28 10*6/mm3    Hemoglobin 13.0 12.0 - 15.9 g/dL    Hematocrit 39.9 34.0 - 46.6 %    MCV 89.6 79.0 - 97.0 fL    MCH 29.3 26.6 - 33.0 pg    MCHC 32.7 31.5 - 35.7 g/dL    RDW 14.4 12.3 - 15.4 %    RDW-SD 44.2 37.0 - 54.0 fl    MPV 7.2 6.0 - 12.0 fL    Platelets 232 140 - 450 10*3/mm3    Neutrophil % 55.4 42.7 - 76.0 %    Lymphocyte % 32.5 19.6 - 45.3 %    Monocyte % 6.4 5.0 - 12.0 %    Eosinophil % 4.5 0.3 - 6.2 %    Basophil % 1.2 0.0 - 1.5 %    Neutrophils, Absolute 2.70 1.70 - 7.00 10*3/mm3    Lymphocytes, Absolute 1.60 0.70 - 3.10 10*3/mm3    Monocytes, Absolute 0.30 0.10 - 0.90 10*3/mm3    Eosinophils, Absolute 0.20 0.00 - 0.40 10*3/mm3    Basophils, Absolute 0.10 0.00 - 0.20 10*3/mm3    nRBC 0.1 0.0 - 0.2 /100 WBC   ECG 12 Lead Dyspnea   Result Value Ref Range    QT Interval 368 ms     XR Chest 1 View    Result Date: 7/18/2023  Impression: No active cardiopulmonary disease Electronically Signed: Ugo Rubalcava  7/18/2023 4:36 PM EDT  Workstation ID: [id number]                                        Medical Decision Making  Present with some dyspnea differential diagnosis including pneumonia, congestive heart failure, acute coronary syndrome, pulmonary embolus, pneumothorax    Patient no signs of DVT, pulmonary embolus felt to be unlikely, she has a history of COPD exacerbation favored.  She is resting comfortably during the emergency room course without any ongoing dyspnea or oxygen saturations are 100%.  Her emergency room work-up essentially normal.  She was advised of findings.  She is encouraged to be  in the air conditioning during this period of poor environmental air quality and to avoid strenuous activity and use her inhalers and follow-up with her primary care physician she is given warning signs for return and agreeable to the plan.    Amount and/or Complexity of Data Reviewed  Labs: ordered. Decision-making details documented in ED Course.     Details: High-sensitivity troponin normal, BNP normal, CBC normal, comprehensive metabolic panel shows some borderline renal insufficiency  Radiology: ordered and independent interpretation performed.     Details: My independent interpretation of chest x-ray image no apparent acute process, no congestive failure or pneumonia or pneumothorax  ECG/medicine tests: ordered and independent interpretation performed.     Details: My EKG interpretation sinus rhythm rate of 83 inferior Q waves not significantly changed from previous    Risk  Prescription drug management.        Final diagnoses:   Dyspnea, unspecified type   COPD exacerbation       ED Disposition  ED Disposition       ED Disposition   Discharge    Condition   Stable    Comment   --               No follow-up provider specified.       Medication List      No changes were made to your prescriptions during this visit.            Konrad Lehman MD  07/18/23 6787

## 2023-07-19 LAB — QT INTERVAL: 368 MS

## 2023-11-02 ENCOUNTER — TELEPHONE (OUTPATIENT)
Dept: NEUROLOGY | Facility: CLINIC | Age: 73
End: 2023-11-02

## 2023-11-02 NOTE — TELEPHONE ENCOUNTER
Caller: Stefania Marie    Relationship: Self    Best call back number: [phone]    What was the call regarding: PATIENT IS CALLING REGARDING THAT SHE NEEDS A FOLLOW UP APPT BUT WHEN INFORMED ABOUT THE [employer] INS SHE SAID THAT SHE MAY NEED TO FIND A NEW PROVIDER OR DO WHATEVER DR. SEIPEL SUGGESTS. THE PATIENT STATED IT IS TIME FOR IMAGING OF HER HEAD AGAIN AND THAT SHE IS HAVE ANOTHER BRAIN FLAIR UP AS LIKE BEFORE.     PLEASE REVIEW  THANK YOU

## 2023-11-08 RX ORDER — LEVETIRACETAM 500 MG/1
750 TABLET ORAL 2 TIMES DAILY
Qty: 270 TABLET | Refills: 3 | Status: SHIPPED | OUTPATIENT
Start: 2023-11-08

## 2023-11-08 NOTE — TELEPHONE ENCOUNTER
PT INFORMED AND VOICED UNDERSTANDING, SHE STATES SHE IS WAITING FOR [employer] TO COME TO AN AGREEMENT W/ [employer]

## 2023-11-16 NOTE — TELEPHONE ENCOUNTER
Spoke to patient and she is aware that we are still OON with [employer] and not sure when that will be corrected. She will continue to stay in contact with us to find out if that gets solved. She wanted you to know that she had a really bad spike in her blood pressure but she is doing better now but doesn't want to go to anyone else but you. She wanted me to send you this message.

## 2023-11-26 ENCOUNTER — HOSPITAL ENCOUNTER (EMERGENCY)
Facility: HOSPITAL | Age: 73
Discharge: HOME | End: 2023-11-26
Attending: EMERGENCY MEDICINE | Admitting: EMERGENCY MEDICINE
Payer: MEDICARE

## 2023-11-26 VITALS
BODY MASS INDEX: 42.04 KG/M2 | DIASTOLIC BLOOD PRESSURE: 90 MMHG | SYSTOLIC BLOOD PRESSURE: 170 MMHG | OXYGEN SATURATION: 98 % | HEIGHT: 61 IN | RESPIRATION RATE: 18 BRPM | WEIGHT: 222.66 LBS | HEART RATE: 74 BPM | TEMPERATURE: 98.3 F

## 2023-11-26 DIAGNOSIS — I15.9 SECONDARY HYPERTENSION: ICD-10-CM

## 2023-11-26 DIAGNOSIS — R07.9 CHEST PAIN, UNSPECIFIED TYPE: Primary | ICD-10-CM

## 2023-11-26 LAB
ANION GAP SERPL CALCULATED.3IONS-SCNC: 13 MMOL/L (ref 5–15)
BASOPHILS # BLD AUTO: 0 10*3/MM3 (ref 0–0.2)
BASOPHILS NFR BLD AUTO: 0.6 % (ref 0–1.5)
BUN SERPL-MCNC: 13 MG/DL (ref 8–23)
BUN/CREAT SERPL: 11.5 (ref 7–25)
CALCIUM SPEC-SCNC: 9.7 MG/DL (ref 8.6–10.5)
CHLORIDE SERPL-SCNC: 108 MMOL/L (ref 98–107)
CO2 SERPL-SCNC: 23 MMOL/L (ref 22–29)
CREAT SERPL-MCNC: 1.13 MG/DL (ref 0.57–1)
DEPRECATED RDW RBC AUTO: 42 FL (ref 37–54)
EGFRCR SERPLBLD CKD-EPI 2021: 51.8 ML/MIN/1.73
EOSINOPHIL # BLD AUTO: 0 10*3/MM3 (ref 0–0.4)
EOSINOPHIL NFR BLD AUTO: 0.8 % (ref 0.3–6.2)
ERYTHROCYTE [DISTWIDTH] IN BLOOD BY AUTOMATED COUNT: 13 % (ref 12.3–15.4)
GLUCOSE SERPL-MCNC: 110 MG/DL (ref 65–99)
HCT VFR BLD AUTO: 40.3 % (ref 34–46.6)
HGB BLD-MCNC: 13.3 G/DL (ref 12–15.9)
HOLD SPECIMEN: NORMAL
LYMPHOCYTES # BLD AUTO: 1.8 10*3/MM3 (ref 0.7–3.1)
LYMPHOCYTES NFR BLD AUTO: 30.8 % (ref 19.6–45.3)
MCH RBC QN AUTO: 29.1 PG (ref 26.6–33)
MCHC RBC AUTO-ENTMCNC: 32.9 G/DL (ref 31.5–35.7)
MCV RBC AUTO: 88.4 FL (ref 79–97)
MONOCYTES # BLD AUTO: 0.4 10*3/MM3 (ref 0.1–0.9)
MONOCYTES NFR BLD AUTO: 6.1 % (ref 5–12)
NEUTROPHILS NFR BLD AUTO: 3.7 10*3/MM3 (ref 1.7–7)
NEUTROPHILS NFR BLD AUTO: 61.7 % (ref 42.7–76)
NRBC BLD AUTO-RTO: 0.1 /100 WBC (ref 0–0.2)
PLATELET # BLD AUTO: 255 10*3/MM3 (ref 140–450)
PMV BLD AUTO: 7.5 FL (ref 6–12)
POTASSIUM SERPL-SCNC: 3.5 MMOL/L (ref 3.5–5.2)
RBC # BLD AUTO: 4.55 10*6/MM3 (ref 3.77–5.28)
SODIUM SERPL-SCNC: 144 MMOL/L (ref 136–145)
TROPONIN T SERPL HS-MCNC: 10 NG/L
WBC NRBC COR # BLD AUTO: 6 10*3/MM3 (ref 3.4–10.8)
WHOLE BLOOD HOLD COAG: NORMAL
WHOLE BLOOD HOLD SPECIMEN: NORMAL

## 2023-11-26 PROCEDURE — 80048 BASIC METABOLIC PNL TOTAL CA: CPT | Performed by: EMERGENCY MEDICINE

## 2023-11-26 PROCEDURE — 93005 ELECTROCARDIOGRAM TRACING: CPT | Performed by: EMERGENCY MEDICINE

## 2023-11-26 PROCEDURE — 84484 ASSAY OF TROPONIN QUANT: CPT | Performed by: EMERGENCY MEDICINE

## 2023-11-26 PROCEDURE — 99283 EMERGENCY DEPT VISIT LOW MDM: CPT

## 2023-11-26 PROCEDURE — 85025 COMPLETE CBC W/AUTO DIFF WBC: CPT | Performed by: EMERGENCY MEDICINE

## 2023-11-26 RX ORDER — SODIUM CHLORIDE 0.9 % (FLUSH) 0.9 %
10 SYRINGE (ML) INJECTION AS NEEDED
Status: DISCONTINUED | OUTPATIENT
Start: 2023-11-26 | End: 2023-11-26 | Stop reason: HOSPADM

## 2023-11-26 RX ORDER — AMLODIPINE BESYLATE 5 MG/1
5 TABLET ORAL DAILY
Qty: 30 TABLET | Refills: 0 | Status: SHIPPED | OUTPATIENT
Start: 2023-11-26

## 2023-11-26 NOTE — ED PROVIDER NOTES
Subjective   History of Present Illness  Patient is a 73-year-old female complaining of mild palpitations and chest discomfort over the past several days.  She also states her blood pressure been elevated when she goes to her dentist.  She denies other complaint.      Review of Systems    Past Medical History:   Diagnosis Date    Anxiety     Environmental and seasonal allergies     GERD (gastroesophageal reflux disease)     Migraine     Obesity     Seizures     Sleep apnea     Tobacco dependency        Allergies   Allergen Reactions    Penicillins Itching       Past Surgical History:   Procedure Laterality Date    GALLBLADDER SURGERY      TUBAL ABDOMINAL LIGATION         Family History   Problem Relation Age of Onset    Hypertension Mother     Cancer Sister     Diabetes Brother     Heart attack Nephew     Diabetes Niece        Social History     Socioeconomic History    Marital status:    Tobacco Use    Smoking status: Former     Packs/day: 2.00     Years: 30.00     Additional pack years: 0.00     Total pack years: 60.00     Types: Cigarettes     Quit date: 1/13/2020     Years since quitting: 3.8    Smokeless tobacco: Never   Vaping Use    Vaping Use: Never used   Substance and Sexual Activity    Alcohol use: No    Drug use: Never    Sexual activity: Defer           Objective   Physical Exam  Neck has no adenopathy JVD or bruits.  Lungs are clear.  Heart has regular rate rhythm without murmur gallop or chest is nontender.  Abdomen is soft nontender.  Extremities M unremarkable.  Procedures     My EKG interpretation is normal sinus rhythm at a rate of 76 with no acute ST change      ED Course      Results for orders placed or performed during the hospital encounter of 11/26/23   Basic Metabolic Panel    Specimen: Blood   Result Value Ref Range    Glucose 110 (H) 65 - 99 mg/dL    BUN 13 8 - 23 mg/dL    Creatinine 1.13 (H) 0.57 - 1.00 mg/dL    Sodium 144 136 - 145 mmol/L    Potassium 3.5 3.5 - 5.2 mmol/L     Chloride 108 (H) 98 - 107 mmol/L    CO2 23.0 22.0 - 29.0 mmol/L    Calcium 9.7 8.6 - 10.5 mg/dL    BUN/Creatinine Ratio 11.5 7.0 - 25.0    Anion Gap 13.0 5.0 - 15.0 mmol/L    eGFR 51.8 (L) >60.0 mL/min/1.73   Single High Sensitivity Troponin T    Specimen: Blood   Result Value Ref Range    HS Troponin T 10 <14 ng/L   CBC Auto Differential    Specimen: Blood   Result Value Ref Range    WBC 6.00 3.40 - 10.80 10*3/mm3    RBC 4.55 3.77 - 5.28 10*6/mm3    Hemoglobin 13.3 12.0 - 15.9 g/dL    Hematocrit 40.3 34.0 - 46.6 %    MCV 88.4 79.0 - 97.0 fL    MCH 29.1 26.6 - 33.0 pg    MCHC 32.9 31.5 - 35.7 g/dL    RDW 13.0 12.3 - 15.4 %    RDW-SD 42.0 37.0 - 54.0 fl    MPV 7.5 6.0 - 12.0 fL    Platelets 255 140 - 450 10*3/mm3    Neutrophil % 61.7 42.7 - 76.0 %    Lymphocyte % 30.8 19.6 - 45.3 %    Monocyte % 6.1 5.0 - 12.0 %    Eosinophil % 0.8 0.3 - 6.2 %    Basophil % 0.6 0.0 - 1.5 %    Neutrophils, Absolute 3.70 1.70 - 7.00 10*3/mm3    Lymphocytes, Absolute 1.80 0.70 - 3.10 10*3/mm3    Monocytes, Absolute 0.40 0.10 - 0.90 10*3/mm3    Eosinophils, Absolute 0.00 0.00 - 0.40 10*3/mm3    Basophils, Absolute 0.00 0.00 - 0.20 10*3/mm3    nRBC 0.1 0.0 - 0.2 /100 WBC   ECG 12 Lead Chest Pain   Result Value Ref Range    QT Interval 381 ms    QTC Interval 430 ms   Lavender Top   Result Value Ref Range    Extra Tube hold for add-on    Gold Top - SST   Result Value Ref Range    Extra Tube hold    Light Blue Top   Result Value Ref Range    Extra Tube Hold for add-ons.      No radiology results for the last day                                       Medical Decision Making  Patient is no evidence of acute coronary syndrome based on EKG and troponin.  Metabolic panel is at baseline without renal insufficiency or electrolyte abnormality.  Patient has no leukocytosis and no anemia noted.  She had no pain throughout the ED visit.  Current blood pressure is 160/90.  Patient be discharged.  She will be placed on Norvasc 5 mg daily.  She will  follow with her MD for further outpatient evaluation.    Amount and/or Complexity of Data Reviewed  Labs: ordered. Decision-making details documented in ED Course.  ECG/medicine tests: ordered and independent interpretation performed.    Risk  Prescription drug management.        Final diagnoses:   Chest pain, unspecified type   Secondary hypertension       ED Disposition  ED Disposition       ED Disposition   Discharge    Condition   Stable    Comment   --               No follow-up provider specified.       Medication List        New Prescriptions      amLODIPine 5 MG tablet  Commonly known as: NORVASC  Take 1 tablet by mouth Daily.               Where to Get Your Medications        Information about where to get these medications is not yet available    Ask your nurse or doctor about these medications  amLODIPine 5 MG tablet            Lukas San MD  11/26/23 [id number]

## 2023-11-26 NOTE — ED NOTES
Pt here for palpitations and high blood pressure. Pt was told by dentist to be seen as her blood pressure was high. Pt not routinely on BP meds.

## 2023-11-27 LAB
QT INTERVAL: 381 MS
QTC INTERVAL: 430 MS

## 2024-01-30 RX ORDER — TRAZODONE HYDROCHLORIDE 50 MG/1
TABLET ORAL
Qty: 90 TABLET | Refills: 3 | Status: SHIPPED | OUTPATIENT
Start: 2024-01-30

## 2024-01-30 RX ORDER — TOPIRAMATE 100 MG/1
TABLET, FILM COATED ORAL
Qty: 180 TABLET | Refills: 3 | Status: SHIPPED | OUTPATIENT
Start: 2024-01-30

## 2024-03-24 ENCOUNTER — HOSPITAL ENCOUNTER (EMERGENCY)
Facility: HOSPITAL | Age: 74
Discharge: HOME | End: 2024-03-24
Attending: EMERGENCY MEDICINE | Admitting: EMERGENCY MEDICINE
Payer: MEDICARE

## 2024-03-24 VITALS
SYSTOLIC BLOOD PRESSURE: 163 MMHG | HEART RATE: 57 BPM | DIASTOLIC BLOOD PRESSURE: 57 MMHG | TEMPERATURE: 98.4 F | HEIGHT: 60 IN | WEIGHT: 214.95 LBS | OXYGEN SATURATION: 100 % | BODY MASS INDEX: 42.2 KG/M2 | RESPIRATION RATE: 18 BRPM

## 2024-03-24 DIAGNOSIS — I49.3 PVC'S (PREMATURE VENTRICULAR CONTRACTIONS): ICD-10-CM

## 2024-03-24 DIAGNOSIS — R00.2 PALPITATIONS: Primary | ICD-10-CM

## 2024-03-24 LAB
ANION GAP SERPL CALCULATED.3IONS-SCNC: 13 MMOL/L (ref 5–15)
BASOPHILS # BLD AUTO: 0.03 10*3/MM3 (ref 0–0.2)
BASOPHILS NFR BLD AUTO: 0.7 % (ref 0–1.5)
BUN SERPL-MCNC: 15 MG/DL (ref 8–23)
BUN/CREAT SERPL: 14 (ref 7–25)
CALCIUM SPEC-SCNC: 9 MG/DL (ref 8.6–10.5)
CHLORIDE SERPL-SCNC: 107 MMOL/L (ref 98–107)
CO2 SERPL-SCNC: 20 MMOL/L (ref 22–29)
CREAT SERPL-MCNC: 1.07 MG/DL (ref 0.57–1)
DEPRECATED RDW RBC AUTO: 40.6 FL (ref 37–54)
EGFRCR SERPLBLD CKD-EPI 2021: 55.3 ML/MIN/1.73
EOSINOPHIL # BLD AUTO: 0.13 10*3/MM3 (ref 0–0.4)
EOSINOPHIL NFR BLD AUTO: 3 % (ref 0.3–6.2)
ERYTHROCYTE [DISTWIDTH] IN BLOOD BY AUTOMATED COUNT: 12.4 % (ref 12.3–15.4)
GLUCOSE SERPL-MCNC: 100 MG/DL (ref 65–99)
HCT VFR BLD AUTO: 39.1 % (ref 34–46.6)
HGB BLD-MCNC: 12.4 G/DL (ref 12–15.9)
IMM GRANULOCYTES # BLD AUTO: 0.01 10*3/MM3 (ref 0–0.05)
IMM GRANULOCYTES NFR BLD AUTO: 0.2 % (ref 0–0.5)
LYMPHOCYTES # BLD AUTO: 1.43 10*3/MM3 (ref 0.7–3.1)
LYMPHOCYTES NFR BLD AUTO: 32.5 % (ref 19.6–45.3)
MAGNESIUM SERPL-MCNC: 1.8 MG/DL (ref 1.6–2.4)
MCH RBC QN AUTO: 28.1 PG (ref 26.6–33)
MCHC RBC AUTO-ENTMCNC: 31.7 G/DL (ref 31.5–35.7)
MCV RBC AUTO: 88.5 FL (ref 79–97)
MONOCYTES # BLD AUTO: 0.34 10*3/MM3 (ref 0.1–0.9)
MONOCYTES NFR BLD AUTO: 7.7 % (ref 5–12)
NEUTROPHILS NFR BLD AUTO: 2.46 10*3/MM3 (ref 1.7–7)
NEUTROPHILS NFR BLD AUTO: 55.9 % (ref 42.7–76)
NRBC BLD AUTO-RTO: 0 /100 WBC (ref 0–0.2)
PLATELET # BLD AUTO: 229 10*3/MM3 (ref 140–450)
PMV BLD AUTO: 8.7 FL (ref 6–12)
POTASSIUM SERPL-SCNC: 3.9 MMOL/L (ref 3.5–5.2)
RBC # BLD AUTO: 4.42 10*6/MM3 (ref 3.77–5.28)
SODIUM SERPL-SCNC: 140 MMOL/L (ref 136–145)
WBC NRBC COR # BLD AUTO: 4.4 10*3/MM3 (ref 3.4–10.8)

## 2024-03-24 PROCEDURE — 85025 COMPLETE CBC W/AUTO DIFF WBC: CPT | Performed by: EMERGENCY MEDICINE

## 2024-03-24 PROCEDURE — 99283 EMERGENCY DEPT VISIT LOW MDM: CPT

## 2024-03-24 PROCEDURE — 93005 ELECTROCARDIOGRAM TRACING: CPT | Performed by: EMERGENCY MEDICINE

## 2024-03-24 PROCEDURE — 83735 ASSAY OF MAGNESIUM: CPT | Performed by: EMERGENCY MEDICINE

## 2024-03-24 PROCEDURE — 93005 ELECTROCARDIOGRAM TRACING: CPT

## 2024-03-24 PROCEDURE — 80048 BASIC METABOLIC PNL TOTAL CA: CPT | Performed by: EMERGENCY MEDICINE

## 2024-03-24 RX ORDER — CALCIUM CARBONATE/VITAMIN D3 500-10/5ML
400 LIQUID (ML) ORAL 2 TIMES DAILY
Qty: 20 EACH | Refills: 0 | Status: SHIPPED | OUTPATIENT
Start: 2024-03-24

## 2024-03-24 RX ORDER — POTASSIUM CHLORIDE 20 MEQ/1
40 TABLET, EXTENDED RELEASE ORAL ONCE
Status: COMPLETED | OUTPATIENT
Start: 2024-03-24 | End: 2024-03-24

## 2024-03-24 RX ORDER — SODIUM CHLORIDE 0.9 % (FLUSH) 0.9 %
10 SYRINGE (ML) INJECTION AS NEEDED
Status: DISCONTINUED | OUTPATIENT
Start: 2024-03-24 | End: 2024-03-24 | Stop reason: HOSPADM

## 2024-03-24 RX ADMIN — POTASSIUM CHLORIDE 40 MEQ: 1500 TABLET, EXTENDED RELEASE ORAL at 15:25

## 2024-03-24 NOTE — ED PROVIDER NOTES
Subjective   History of Present Illness  33-year-old female presents with complaints of palpitations.  She noted yesterday when walking some on again today.  She states she gets short of breath with exertion but this is not new this been going on for months.  She has had no chest pain.  She states that she has had this previously having given medication but does not take it routinely. She has had no leg pain.  Review of Systems    Past Medical History:   Diagnosis Date    Anxiety     Environmental and seasonal allergies     GERD (gastroesophageal reflux disease)     Migraine     Obesity     Seizures     Sleep apnea     Tobacco dependency        Allergies   Allergen Reactions    Penicillins Itching       Past Surgical History:   Procedure Laterality Date    GALLBLADDER SURGERY      TUBAL ABDOMINAL LIGATION         Family History   Problem Relation Age of Onset    Hypertension Mother     Cancer Sister     Diabetes Brother     Heart attack Nephew     Diabetes Niece        Social History     Socioeconomic History    Marital status:    Tobacco Use    Smoking status: Former     Current packs/day: 0.00     Average packs/day: 2.0 packs/day for 30.0 years (60.0 ttl pk-yrs)     Types: Cigarettes     Start date: 1990     Quit date: 2020     Years since quittin.1    Smokeless tobacco: Never   Vaping Use    Vaping status: Never Used   Substance and Sexual Activity    Alcohol use: No    Drug use: Never    Sexual activity: Defer     Prior to Admission medications    Medication Sig Start Date End Date Taking? Authorizing Provider   amLODIPine (NORVASC) 5 MG tablet Take 1 tablet by mouth Daily. 23   Lukas San MD   aspirin (ADULT ASPIRIN EC LOW STRENGTH) 81 MG EC tablet  9/21/15   Parvez Wilde MD   baclofen (LIORESAL) 10 MG tablet TK 1 T PO TID PRN 4/15/20   Parvez Wilde MD   busPIRone (BUSPAR) 10 MG tablet Take 10 mg by mouth 4 (Four) Times a Day.    Parvez Wilde MD    Calcium Carb-Cholecalciferol 1000-800 MG-UNIT tablet CALTRATE 600+D3 TABS    Parvez Wilde MD   diclofenac (VOLTAREN) 50 MG EC tablet TAKE 1 TABLET BY MOUTH AS NEEDED FOR HEADACHE, MAY REPEAT IN 4 HOURS ONCE 3/5/20   Seipel, Joseph F, MD   EPINEPHrine (EPIPEN) 0.3 MG/0.3ML solution auto-injector injection INJ UTD 5/19/20   Parvez Wilde MD   fluticasone (FLONASE) 50 MCG/ACT nasal spray  3/17/20   Parvez Wilde MD   gabapentin (NEURONTIN) 400 MG capsule TAKE 1 CAPSULE BY MOUTH THREE TIMES DAILY 5/16/22   Seipel, Joseph F, MD   levETIRAcetam (KEPPRA) 500 MG tablet TAKE 1 AND 1/2 TABLETS BY MOUTH TWICE DAILY 11/8/23   Seipel, Joseph F, MD   levothyroxine (SYNTHROID, LEVOTHROID) 100 MCG tablet Take 100 mcg by mouth Daily.    Parvez Wilde MD   levothyroxine sodium (TIROSINT) 100 MCG capsule     Parvez Wilde MD   Magnesium Oxide 400 MG capsule Take 400 mg by mouth 2 (Two) Times a Day. 3/24/24   Deepak Terrell MD   methIMAzole (TAPAZOLE) 10 MG tablet Take 10 mg by mouth 2 (Two) Times a Day. 4/13/20   Parvez Wilde MD   montelukast (SINGULAIR) 10 MG tablet Take 10 mg by mouth Every Night. 9/4/19   Parvez Wilde MD   Multiple Vitamins-Minerals (CENTRUM ADULTS PO) Take 1 tablet by mouth Daily.    Parvez Wilde MD   omeprazole (priLOSEC) 40 MG capsule TK ONE C PO  ONCE D 10/18/19   Parvez Wilde MD   oxybutynin (DITROPAN) 5 MG tablet TAKE 1 TABLET BY MOUTH TWICE DAILY FOR INCONTINENCY 11/16/22   Parvez Wilde MD   PARoxetine (PAXIL) 20 MG tablet Take 20 mg by mouth Every Morning. 9/24/19   Parvez Wilde MD   potassium chloride (K-DUR,KLOR-CON) 20 MEQ CR tablet Take 20 mEq by mouth 3 (Three) Times a Day. 9/19/19   Parvez Wilde MD   SUMAtriptan (IMITREX) 100 MG tablet Take 100 mg by mouth Every 2 (Two) Hours As Needed for Migraine. Take one tablet at onset of headache. May repeat dose one time in 2 hours if headache not  relieved.    Provider, MD Parvez   topiramate (TOPAMAX) 100 MG tablet TAKE 1 TABLET BY MOUTH TWICE DAILY 1/30/24   Seipel, Joseph F, MD   traZODone (DESYREL) 50 MG tablet TAKE 1 TABLET BY MOUTH DAILY AT BEDTIME 1/30/24   Seipel, Joseph F, MD           Objective   Physical Exam  71-year-old female awake alert.  Generally well-developed well-nourished overweight.  Pupils equal round react light.  Neck supple chest clear cardiovascular regular rhythm with frequent ectopic beats.  No murmurs appreciated.  Abdomen soft nontender extremity without tenderness significant edema.  Procedures           ED Course      Results for orders placed or performed during the hospital encounter of 03/24/24   Basic Metabolic Panel    Specimen: Blood   Result Value Ref Range    Glucose 100 (H) 65 - 99 mg/dL    BUN 15 8 - 23 mg/dL    Creatinine 1.07 (H) 0.57 - 1.00 mg/dL    Sodium 140 136 - 145 mmol/L    Potassium 3.9 3.5 - 5.2 mmol/L    Chloride 107 98 - 107 mmol/L    CO2 20.0 (L) 22.0 - 29.0 mmol/L    Calcium 9.0 8.6 - 10.5 mg/dL    BUN/Creatinine Ratio 14.0 7.0 - 25.0    Anion Gap 13.0 5.0 - 15.0 mmol/L    eGFR 55.3 (L) >60.0 mL/min/1.73   Magnesium    Specimen: Blood   Result Value Ref Range    Magnesium 1.8 1.6 - 2.4 mg/dL   CBC Auto Differential    Specimen: Blood   Result Value Ref Range    WBC 4.40 3.40 - 10.80 10*3/mm3    RBC 4.42 3.77 - 5.28 10*6/mm3    Hemoglobin 12.4 12.0 - 15.9 g/dL    Hematocrit 39.1 34.0 - 46.6 %    MCV 88.5 79.0 - 97.0 fL    MCH 28.1 26.6 - 33.0 pg    MCHC 31.7 31.5 - 35.7 g/dL    RDW 12.4 12.3 - 15.4 %    RDW-SD 40.6 37.0 - 54.0 fl    MPV 8.7 6.0 - 12.0 fL    Platelets 229 140 - 450 10*3/mm3    Neutrophil % 55.9 42.7 - 76.0 %    Lymphocyte % 32.5 19.6 - 45.3 %    Monocyte % 7.7 5.0 - 12.0 %    Eosinophil % 3.0 0.3 - 6.2 %    Basophil % 0.7 0.0 - 1.5 %    Immature Grans % 0.2 0.0 - 0.5 %    Neutrophils, Absolute 2.46 1.70 - 7.00 10*3/mm3    Lymphocytes, Absolute 1.43 0.70 - 3.10 10*3/mm3    Monocytes,  "Absolute 0.34 0.10 - 0.90 10*3/mm3    Eosinophils, Absolute 0.13 0.00 - 0.40 10*3/mm3    Basophils, Absolute 0.03 0.00 - 0.20 10*3/mm3    Immature Grans, Absolute 0.01 0.00 - 0.05 10*3/mm3    nRBC 0.0 0.0 - 0.2 /100 WBC   ECG 12 Lead Other; palpitations   Result Value Ref Range    QT Interval 339 ms    QTC Interval 423 ms     No radiology results for the last day  Medications   sodium chloride 0.9 % flush 10 mL (has no administration in time range)   potassium chloride (KLOR-CON M20) CR tablet 40 mEq (40 mEq Oral Given 3/24/24 1525)     /57 (BP Location: Left arm, Patient Position: Sitting)   Pulse 57   Temp 98.4 °F (36.9 °C) (Temporal)   Resp 18   Ht 152.4 cm (60\")   Wt 97.5 kg (214 lb 15.2 oz)   SpO2 100%   BMI 41.98 kg/m²                                          Medical Decision Making  Amount and/or Complexity of Data Reviewed  Labs: ordered.  ECG/medicine tests: ordered.    Risk  Prescription drug management.    Chart review: Patient had Myoview stress test 2020 that showed normal myocardial perfusion.  Echocardiogram at that time showed normal LV systolic function with left ventricular diastolic dysfunction grade 1.  Comorbidity: As per past history   Differential: Left-sided abnormality, PACs, PVCs, atrial fibrillation,  My EKG interpretation: Sinus rhythm with frequent PVCs times in bigeminal pattern.  Compared to previous PVCs now present.  Lab: Magnesium low normal at 1.8 basic metabolic panel potassium 3.9 CBC normal  My Radiology review and interpretation: Chest x-ray not felt to be indicated and that she does not complain of new shortness of breath and has no pain  Discussion/treatment: Patient was given dose of potassium as she has low normal.  She will be discharged placed on a course of magnesium as she is low normal there also.  She has to follow-up with her primary provider, she is to follow-up with her cardiologist, call tomorrow, return new or worsening symptoms.  She will not be " started on a beta-blocker as she has pre-existing bradycardia with heart rate upper 50s to lower 60s.  Patient was evaluated using appropriate PPE      Final diagnoses:   Palpitations   PVC's (premature ventricular contractions)       ED Disposition  ED Disposition       ED Disposition   Discharge    Condition   Stable    Comment   --               Ugo Gil MD  [address] [facility] [place] [address] [phone]          Anderson Stephens MD  [address] [facility] [place] [address] [phone]               Medication List        New Prescriptions      Magnesium Oxide 400 MG capsule  Take 400 mg by mouth 2 (Two) Times a Day.               Where to Get Your Medications        These medications were sent to [id number] - [place], [place] - [address] AT SEC OF [address] - [phone]  - [phone]   [address] [place] [address]      Phone: [phone]   Magnesium Oxide 400 MG capsule            Deepak Terrell MD  03/24/24 5861

## 2024-03-24 NOTE — ED NOTES
Pt reports feeling palpitations that started yesterday evening after walking at the mall. Pt reports sometimes it makes her short of breath but not every time. Pt denies chest pain. Pt reports this has happened in the past and she was prescribed medicine but only took it a couple times.

## 2024-03-24 NOTE — DISCHARGE INSTRUCTIONS
Follow-up with Dr. Gil or Angelo call office tomorrow for follow-up.  Rest today, return new or worsening symptoms

## 2024-03-25 LAB
QT INTERVAL: 339 MS
QTC INTERVAL: 423 MS

## 2024-05-02 ENCOUNTER — TELEPHONE (OUTPATIENT)
Dept: NEUROLOGY | Facility: CLINIC | Age: 74
End: 2024-05-02
Payer: MEDICARE

## 2024-05-02 NOTE — TELEPHONE ENCOUNTER
Spoke to patient and gave instructions about weaning topamax. She expressed understanding. Will call and let us know how she is doing in a few months.

## 2024-05-02 NOTE — TELEPHONE ENCOUNTER
Topamax can cause side effects usually tingling in the fingers and increased risk of kidney stones.  Other side effects are more rare.  Only way to know would be to gradually wean down the topamax. You are taking it primarilay for the migraine and are on the keppra for seizures, So it is reasonable to decrease the topamax.  If you have been taking 100mg one tab twice per day, Decrease to 1/2 tab am and one pm for one month then 1/2 tab twice per day.  Stay on that dose for a few months and let me know how your doing in terms of ha , seizures, and at that point may wean rest of the way off.  I'll see you at your appt in October

## 2024-05-02 NOTE — TELEPHONE ENCOUNTER
Caller: Stefania Marie    Relationship: Self    Best call back number: [phone]*    Which medication are you concerned about: TOPAMAX    Who prescribed you this medication: DR SEIPEL    When did you start taking this medication: YEARS AGO    What are your concerns: THE PATIENT STATED SHE READ A DOCUMENT ON THE MEDICATION AND SHE IS HAVING A LOT OF THE LONG TERM SIDE EFFECTS AND SHE HAS CONCERNS REGARDING THIS MED AND THE CONTINUATION.     SHE SAID SHE HAS ANXIETY AND PANIC ATTACKS, SHE GETS DIZZY WHEN SHE STAND (MOST IMPORTANT TO HER IN THE SITUATION), IRRITABLE. SHE SAID THAT ITS BEEN BUILDING THE LAST FEW YEARS AND THE DOCUMENT ABOUT THE MEDICATION SAID THESE COULD BE LONG TERM SIDE EFFECTS.     PLEASE REVIEW  THANK YOU

## 2024-10-21 NOTE — PROGRESS NOTES
"Chief Complaint  Follow-up (ALEX AND SEIZURES)    Subjective          Stefania Marie presents to [facility]  History of Present Illness  ALEX, f/u patient doing well with pap therapy and uses nasal pillows and through [facility] for supplies.     SEIZURES- last seizure 20+ yrs agoshe currently takes keppra 500 mg 0.5 tabs bid  and topamax 100 mg bid (PT TAKES SOMETIMES)      Migraine, infrequent now no mediatation needed    Anxiety, pt has taken buspar in the past with benefit     Knee pain--will renew Voltaren and refer to ortho     Sleep testing history:    On NPSG at [facility] , 03/18/2014 patient had Severe obstructive sleep apnea syndrome with apnea-hypopnea index of 41.9 per sleep hour, minimum SpO2 of 79%  LBS    Pt has not been using due to the recall    [place] Sleepiness Scale:  Sitting and reading JS [place] Sleepiness: 0 WatchingTV JS [place] Sleepiness: 0  Sitting, inactive, in a public place JS [place] Sleepiness: 0  As a passenger in a car for 1 hour w/o a break  JS [place] Sleepiness: 0  Lying down to rest in the afternoon  JS [place] Sleepiness: 0  Sitting and talking to someone  JS [place] Sleepiness: 0  Sitting quietly after a lunch  JS [place] Sleepiness: 0  In a car, while stopped for traffic or a light  JS [place] Sleepiness: 0  Total 0      Review of Systems   HENT:  Positive for dental problem and sinus pressure.    Eyes:  Positive for visual disturbance.   Respiratory:  Positive for apnea.    Cardiovascular:  Positive for leg swelling.   Allergic/Immunologic: Positive for environmental allergies.   All other systems reviewed and are negative.      Objective   Vital Signs:   BP (!) 188/78   Pulse 72   Resp 18   Ht 152.4 cm (60\")   Wt 104 kg (230 lb)   BMI 44.92 kg/m²     Physical Exam  Vitals reviewed.   Cardiovascular:      Pulses: Normal pulses.   Pulmonary:      Effort: Pulmonary effort is normal. No respiratory distress.   Neurological:      Mental Status: She " is alert and oriented to person, place, and time.   Psychiatric:         Mood and Affect: Mood normal.      Result Review :                 Assessment and Plan    Diagnoses and all orders for this visit:    1. Complex partial seizure (Primary)    2. Migraine without aura and without status migrainosus, not intractable    3. Sleep apnea, unspecified type    4. Insomnia, unspecified type    5. Primary osteoarthritis of both knees    6. Anxiety      Will order new cpap    Continue keppra 500mg daily, increase to 1000if seizure occurs, dc topamac    Continue buspar for anxiety and trazodone for slee    No medication needed for migraine      Follow Up   Return in about 1 year (around 10/22/2025).    Patient was given instructions and counseling regarding her condition or for health maintenance advice. Please see specific information pulled into the AVS if appropriate.       This document has been electronically signed by Joseph Seipel, MD on October 22, 2024 08:59 EDT

## 2024-10-22 ENCOUNTER — OFFICE VISIT (OUTPATIENT)
Dept: NEUROLOGY | Facility: CLINIC | Age: 74
End: 2024-10-22
Payer: MEDICARE

## 2024-10-22 VITALS
HEART RATE: 72 BPM | BODY MASS INDEX: 45.16 KG/M2 | DIASTOLIC BLOOD PRESSURE: 78 MMHG | WEIGHT: 230 LBS | HEIGHT: 60 IN | RESPIRATION RATE: 18 BRPM | SYSTOLIC BLOOD PRESSURE: 188 MMHG

## 2024-10-22 DIAGNOSIS — G43.009 MIGRAINE WITHOUT AURA AND WITHOUT STATUS MIGRAINOSUS, NOT INTRACTABLE: Chronic | ICD-10-CM

## 2024-10-22 DIAGNOSIS — G47.30 SLEEP APNEA, UNSPECIFIED TYPE: Chronic | ICD-10-CM

## 2024-10-22 DIAGNOSIS — M17.0 PRIMARY OSTEOARTHRITIS OF BOTH KNEES: Chronic | ICD-10-CM

## 2024-10-22 DIAGNOSIS — G47.00 INSOMNIA, UNSPECIFIED TYPE: ICD-10-CM

## 2024-10-22 DIAGNOSIS — G40.209 COMPLEX PARTIAL SEIZURE: Primary | Chronic | ICD-10-CM

## 2024-10-22 DIAGNOSIS — F41.9 ANXIETY: Chronic | ICD-10-CM

## 2024-10-22 PROCEDURE — 99214 OFFICE O/P EST MOD 30 MIN: CPT | Performed by: PSYCHIATRY & NEUROLOGY

## 2024-10-22 PROCEDURE — 1160F RVW MEDS BY RX/DR IN RCRD: CPT | Performed by: PSYCHIATRY & NEUROLOGY

## 2024-10-22 PROCEDURE — 1159F MED LIST DOCD IN RCRD: CPT | Performed by: PSYCHIATRY & NEUROLOGY

## 2024-10-22 RX ORDER — LEVETIRACETAM 500 MG/1
250 TABLET ORAL 2 TIMES DAILY
Qty: 180 TABLET | Refills: 1 | Status: SHIPPED | OUTPATIENT
Start: 2024-10-22

## 2024-10-22 RX ORDER — BUSPIRONE HYDROCHLORIDE 10 MG/1
10 TABLET ORAL 3 TIMES DAILY
Qty: 270 TABLET | Refills: 1 | Status: SHIPPED | OUTPATIENT
Start: 2024-10-22

## 2024-10-22 RX ORDER — TRAZODONE HYDROCHLORIDE 50 MG/1
50 TABLET, FILM COATED ORAL
Qty: 90 TABLET | Refills: 3 | Status: SHIPPED | OUTPATIENT
Start: 2024-10-22

## 2024-10-24 ENCOUNTER — TELEPHONE (OUTPATIENT)
Dept: NEUROLOGY | Facility: CLINIC | Age: 74
End: 2024-10-24
Payer: MEDICARE

## 2024-10-24 NOTE — TELEPHONE ENCOUNTER
Caller: Stefania Marie    Relationship: Self    Best call back number: [phone]    Who are you requesting to speak with (clinical staff, provider,  specific staff member): DR. SEIPEL    What was the call regarding: THE PATIENT IS ASKING IF DR SEIPEL KNOWS HOW MANY SEIZURES SHE HAD THE FIRST TIME IT WAS REPORTED. WHEN SHE FIRST WENT TO THE ER.     PLEASE ADVISE  THANK YOU

## 2024-10-25 NOTE — TELEPHONE ENCOUNTER
I recall it was two seizures. At [facility] across the street from the hospital either 1990 pr 1991

## 2024-10-25 NOTE — TELEPHONE ENCOUNTER
Spoke to patient   She stated she did have one at the house then at the hospital then at [facility] then went back to hospital.   She can't recall how many in total she will contact another doctor

## 2024-11-04 RX ORDER — TOPIRAMATE 100 MG/1
TABLET, FILM COATED ORAL
Qty: 180 TABLET | Refills: 3 | OUTPATIENT
Start: 2024-11-04

## 2024-11-05 ENCOUNTER — OFFICE VISIT (OUTPATIENT)
Dept: ORTHOPEDIC SURGERY | Facility: CLINIC | Age: 74
End: 2024-11-05
Payer: MEDICARE

## 2024-11-05 VITALS — BODY MASS INDEX: 45.16 KG/M2 | WEIGHT: 230 LBS | RESPIRATION RATE: 20 BRPM | HEIGHT: 60 IN | OXYGEN SATURATION: 95 %

## 2024-11-05 DIAGNOSIS — G89.29 CHRONIC PAIN OF RIGHT KNEE: Primary | ICD-10-CM

## 2024-11-05 DIAGNOSIS — M17.0 PRIMARY OSTEOARTHRITIS OF BOTH KNEES: ICD-10-CM

## 2024-11-05 DIAGNOSIS — M25.561 CHRONIC PAIN OF RIGHT KNEE: Primary | ICD-10-CM

## 2024-11-05 DIAGNOSIS — M25.562 ACUTE PAIN OF LEFT KNEE: ICD-10-CM

## 2024-11-05 RX ORDER — TRIAMCINOLONE ACETONIDE 40 MG/ML
80 INJECTION, SUSPENSION INTRA-ARTICULAR; INTRAMUSCULAR
Status: COMPLETED | OUTPATIENT
Start: 2024-11-05 | End: 2024-11-05

## 2024-11-05 RX ORDER — DEXTROMETHORPHAN HYDROBROMIDE AND PROMETHAZINE HYDROCHLORIDE 15; 6.25 MG/5ML; MG/5ML
SYRUP ORAL
COMMUNITY
Start: 2024-10-29

## 2024-11-05 RX ORDER — MONTELUKAST SODIUM 10 MG/1
TABLET ORAL
COMMUNITY
Start: 2024-11-02

## 2024-11-05 RX ORDER — OMEPRAZOLE 40 MG/1
CAPSULE, DELAYED RELEASE ORAL
COMMUNITY
Start: 2024-10-25

## 2024-11-05 RX ORDER — LIDOCAINE HYDROCHLORIDE 10 MG/ML
2 INJECTION, SOLUTION INFILTRATION; PERINEURAL
Status: COMPLETED | OUTPATIENT
Start: 2024-11-05 | End: 2024-11-05

## 2024-11-05 RX ADMIN — TRIAMCINOLONE ACETONIDE 80 MG: 40 INJECTION, SUSPENSION INTRA-ARTICULAR; INTRAMUSCULAR at 09:57

## 2024-11-05 RX ADMIN — LIDOCAINE HYDROCHLORIDE 2 ML: 10 INJECTION, SOLUTION INFILTRATION; PERINEURAL at 09:57

## 2024-11-05 NOTE — PROGRESS NOTES
[facility]        Patient Name: Stefania Marie  : 1950  Primary Care Physician: Ugo Gil MD        Chief Complaint:    Chief Complaint   Patient presents with    Left Knee - Pain, Initial Evaluation     Pain for years        Right Knee - Pain, Initial Evaluation     Pain for years         HPI:   Stefania Marie is a 74 y.o. year old who presents today for evaluation of bilateral knee pain. Onset of symptoms was many years ago. She denies any injury, trauma or prior surgery.     She did previously receive steroid injections in the knees with good relief of symptoms. Her last injection was ~ 7 years ago.     Her pain is located globally throughout the bilateral knees. She has significant joint stiffness when rising from a sitting position. She is unable to kneel due to pain and has increased pain symptoms with stairs.        Past Medical/Surgical, Social and Family History:  I have reviewed and/or updated pertinent history as noted in the medical record including:  Past Medical History:   Diagnosis Date    Anxiety     Environmental and seasonal allergies     GERD (gastroesophageal reflux disease)     Migraine     Obesity     Seizures     Sleep apnea     Tobacco dependency      Past Surgical History:   Procedure Laterality Date    GALLBLADDER SURGERY      TUBAL ABDOMINAL LIGATION       Social History     Occupational History    Not on file   Tobacco Use    Smoking status: Former     Current packs/day: 0.00     Average packs/day: 2.0 packs/day for 30.0 years (60.0 ttl pk-yrs)     Types: Cigarettes     Start date: 1990     Quit date: 2020     Years since quittin.8    Smokeless tobacco: Never   Vaping Use    Vaping status: Never Used   Substance and Sexual Activity    Alcohol use: No    Drug use: Never    Sexual activity: Defer          Allergies:   Allergies   Allergen Reactions    Penicillins Itching       Medications:   Home Medications:  Current Outpatient Medications on File Prior to Visit    Medication Sig    amLODIPine (NORVASC) 5 MG tablet Take 1 tablet by mouth Daily.    aspirin (ADULT ASPIRIN EC LOW STRENGTH) 81 MG EC tablet     baclofen (LIORESAL) 10 MG tablet TK 1 T PO TID PRN    busPIRone (BUSPAR) 10 MG tablet Take 1 tablet by mouth 3 (Three) Times a Day.    Calcium Carb-Cholecalciferol 1000-800 MG-UNIT tablet CALTRATE 600+D3 TABS    diclofenac (VOLTAREN) 50 MG EC tablet Take 1 tablet by mouth 2 (Two) Times a Day.    EPINEPHrine (EPIPEN) 0.3 MG/0.3ML solution auto-injector injection INJ UTD    fluticasone (FLONASE) 50 MCG/ACT nasal spray     gabapentin (NEURONTIN) 400 MG capsule TAKE 1 CAPSULE BY MOUTH THREE TIMES DAILY    levETIRAcetam (KEPPRA) 500 MG tablet Take 0.5 tablets by mouth 2 (Two) Times a Day. 1/2 TAB BID    levothyroxine (SYNTHROID, LEVOTHROID) 100 MCG tablet Take 1 tablet by mouth Daily.    montelukast (SINGULAIR) 10 MG tablet     Multiple Vitamins-Minerals (CENTRUM ADULTS PO) Take 1 tablet by mouth Daily.    omeprazole (priLOSEC) 40 MG capsule     oxybutynin (DITROPAN) 5 MG tablet TAKE 1 TABLET BY MOUTH TWICE DAILY FOR INCONTINENCY    potassium chloride (K-DUR,KLOR-CON) 20 MEQ CR tablet Take 1 tablet by mouth 3 (Three) Times a Day.    promethazine-dextromethorphan (PROMETHAZINE-DM) 6.25-15 MG/5ML syrup TAKE 5 ML BY MOUTH EVERY 6 HOURS AS NEEDED    traZODone (DESYREL) 50 MG tablet Take 1 tablet by mouth every night at bedtime.     No current facility-administered medications on file prior to visit.         ROS:  Negative unless listed in the HPI    Physical Exam:   74 y.o. female  Body mass index is 44.92 kg/m²., 104 kg (230 lb)  Vitals:    11/05/24 0847   Resp: 20   SpO2: 95%     General: Alert, cooperative, appears well and in no observable distress.   HEENT: Normocephalic, atraumatic on external visual inspection. No icterus.   CV: No significant peripheral edema.    Respiratory: Normal respiratory effort.   Skin: Warm & well perfused; appropriate skin turgor.  Psych:  Appropriate mood & affect.  Neuro: Gross sensation and motor intact in affected extremity/extremities.  Vascular: Peripheral pulses palpable in affected extremity/extremities.     Ortho Exam   Left/right knee. Patient has crepitus throughout range of motion. Significant joint line tenderness is noted on the medial aspect of the knee. Patient has a varus orientation of the knee. There is fullness and tenderness in the Popliteal fossa. Mild distention of a Popliteal cyst is noted in this location. Range of motion in flexion is from 0-110 degrees. Neurovascular status is intact.  Patient's gait is cautious and antalgic. Skin and soft tissues are mildly swollen, consistent with synovitis and effusion. The patient has a significant limp with the first few steps after starting the gait cycle. Getting out of a chair takes a lot of effort due to pain on knee flexion.       Radiology:  X-Ray Report:  Bilateral knee(s) X-Ray  Indication: Evaluation of pain  AP, Lateral views  Findings: advanced degenerative changes with significant medial joint space loss bilaterally   Bony lesion: no  Soft tissues: within normal limits  Joint spaces: decreased  Hardware appropriately positioned: not applicable  Prior studies available for comparison: no   X-RAY was ordered and reviewed by SHASTA Lopez    Large Joint Arthrocentesis: R knee  Date/Time: 11/5/2024 9:57 AM  Consent given by: patient  Site marked: site marked  Timeout: Immediately prior to procedure a time out was called to verify the correct patient, procedure, equipment, support staff and site/side marked as required   Supporting Documentation  Indications: pain and diagnostic evaluation   Procedure Details  Location: knee - R knee  Needle size: 25 G  Approach: anterolateral  Medications administered: 2 mL lidocaine 1 %; 80 mg triamcinolone acetonide 40 MG/ML  Patient tolerance: patient tolerated the procedure well with no immediate complications      Large Joint  Arthrocentesis: L knee  Date/Time: 11/5/2024 9:57 AM  Consent given by: patient  Site marked: site marked  Timeout: Immediately prior to procedure a time out was called to verify the correct patient, procedure, equipment, support staff and site/side marked as required   Supporting Documentation  Indications: pain and diagnostic evaluation   Procedure Details  Location: knee - L knee  Needle size: 25 G  Approach: anterolateral  Medications administered: 2 mL lidocaine 1 %; 80 mg triamcinolone acetonide 40 MG/ML  Patient tolerance: patient tolerated the procedure well with no immediate complications            Assessment:  Osteoarthritis, bilateral knees   Body mass index is 44.92 kg/m².  BMI consistent with Obese Class III extreme obesity: > or equal to 40kg/m2  Class 3 Severe Obesity (BMI >=40). Obesity-related health conditions include the following: osteoarthritis. Obesity is newly identified. BMI is is above average; no BMI management plan is appropriate. We discussed portion control and increasing exercise.        Plan:  I have reviewed the above imaging with the patient in detail today  She has advanced degenerative changes noted in both knees  She received steroid injections in the past with good relief of symptoms. Will resume today  If steroids are not effective, will transition to VISCO injections  She will likely require knee replacement at some point, but will try to hold off with injections for as long as possible   Follow up in 3 months   Patient encouraged to call with any questions or concerns in the interim    SHASTA Lopez

## 2024-11-06 ENCOUNTER — PATIENT ROUNDING (BHMG ONLY) (OUTPATIENT)
Dept: ORTHOPEDIC SURGERY | Facility: CLINIC | Age: 74
End: 2024-11-06
Payer: MEDICARE

## 2024-11-14 ENCOUNTER — TELEPHONE (OUTPATIENT)
Dept: NEUROLOGY | Facility: CLINIC | Age: 74
End: 2024-11-14

## 2024-11-14 NOTE — TELEPHONE ENCOUNTER
Provider: DR SEIPEL    Caller: Stefania Marie    Relationship to Patient: Self    Phone Number: [phone]    Reason for Call: STATES SHE HAD TO CANCEL APPT TO GET CPAP DUE TO TRANSPORTATION ISSUES. WILL GET IT RESCHEDULED WHEN SHE CAN. JUST WANTED TO MAKE PROVIDER AWARE. THANK YOU.

## 2025-01-16 ENCOUNTER — TELEPHONE (OUTPATIENT)
Dept: NEUROLOGY | Facility: CLINIC | Age: 75
End: 2025-01-16
Payer: MEDICARE

## 2025-01-16 NOTE — TELEPHONE ENCOUNTER
Caller: Stefania Marie    Relationship: Self    Best call back number: [phone]    Preferred pharmacy: [id number] [place] - [address] - [phone]  - [phone] FX      What was the call regarding: PT STATES SHE USED TO TAKE PAROXETINE WHICH WAS PRESCRIBED BY HER PREVIOUS PSYCHIATRIST, DOLORES ESCOBAR. HOWEVER, HER PSYCHIATRIST RETIRED APPROXIMATELY 2 YEARS AGO SO PT HAS BEEN WITHOUT THE MEDICATION SINCE HER nursing home. PT HAS CALLED TO ASK IF DR. SEIPEL WOULD BE WILLING TO TAKE OVER THIS RX FOR HER?    PT STATES SHE OCCASIONALLY GETS FLUSTERED WHICH LEADS TO HER HAVING SOME CONFUSION AND ANXIETY. ONCE PT IS ABLE TO CALM DOWN, SHE IS ABLE TO THINK MORE CLEARLY AND REMEMBER MORE EASILY. PT FEELS THAT THE PAROXETINE HELPED WITH THIS WHEN SHE WAS STILL TAKING IT.     PT STATES SHE HAS TAKING 1/2 TABLET OF THE BUSPAR 10MG IN THE MORNING AND ANOTHER 1/2 TABLET IN THE AFTERNOON. **THIS SEEMS TO DIFFER THAN WHAT IS LISTED IN PT'S CHART. CAN OFFICE CONTACT PT TO CLARIFY THIS AS WELL?    Do you require a callback: YES, PLEASE.    PLEASE REVIEW AND ADVISE.

## 2025-03-11 ENCOUNTER — OFFICE VISIT (OUTPATIENT)
Dept: ORTHOPEDIC SURGERY | Facility: CLINIC | Age: 75
End: 2025-03-11
Payer: MEDICARE

## 2025-03-11 VITALS — HEIGHT: 60 IN | WEIGHT: 225 LBS | OXYGEN SATURATION: 97 % | BODY MASS INDEX: 44.17 KG/M2 | RESPIRATION RATE: 20 BRPM

## 2025-03-11 DIAGNOSIS — M17.0 PRIMARY OSTEOARTHRITIS OF BOTH KNEES: Primary | ICD-10-CM

## 2025-03-11 RX ORDER — LIDOCAINE HYDROCHLORIDE 10 MG/ML
2 INJECTION, SOLUTION EPIDURAL; INFILTRATION; INTRACAUDAL; PERINEURAL
Status: COMPLETED | OUTPATIENT
Start: 2025-03-11 | End: 2025-03-11

## 2025-03-11 RX ORDER — TRIAMCINOLONE ACETONIDE 40 MG/ML
80 INJECTION, SUSPENSION INTRA-ARTICULAR; INTRAMUSCULAR
Status: COMPLETED | OUTPATIENT
Start: 2025-03-11 | End: 2025-03-11

## 2025-03-11 RX ADMIN — LIDOCAINE HYDROCHLORIDE 2 ML: 10 INJECTION, SOLUTION EPIDURAL; INFILTRATION; INTRACAUDAL; PERINEURAL at 09:09

## 2025-03-11 RX ADMIN — TRIAMCINOLONE ACETONIDE 80 MG: 40 INJECTION, SUSPENSION INTRA-ARTICULAR; INTRAMUSCULAR at 09:09

## 2025-03-11 RX ADMIN — LIDOCAINE HYDROCHLORIDE 2 ML: 10 INJECTION, SOLUTION EPIDURAL; INFILTRATION; INTRACAUDAL; PERINEURAL at 09:10

## 2025-03-11 RX ADMIN — TRIAMCINOLONE ACETONIDE 80 MG: 40 INJECTION, SUSPENSION INTRA-ARTICULAR; INTRAMUSCULAR at 09:10

## 2025-03-11 NOTE — PROGRESS NOTES
INJECTION VISIT    Patient: Stefania Marie    YOB: 1950    MRN: [id number]    Chief Complaint   Patient presents with    Left Knee - Follow-up    Right Knee - Follow-up       History of Present Illness:   Stefania Marie is a 74 y.o. year old who presents today for injection of bilateral knees. Last injections were 3 months ago and provided good relief of symptoms.         Allergies:   Allergies   Allergen Reactions    Penicillins Itching       Medications:   Home Medications:  Current Outpatient Medications on File Prior to Visit   Medication Sig    amLODIPine (NORVASC) 5 MG tablet Take 1 tablet by mouth Daily.    aspirin (ADULT ASPIRIN EC LOW STRENGTH) 81 MG EC tablet     baclofen (LIORESAL) 10 MG tablet TK 1 T PO TID PRN    busPIRone (BUSPAR) 10 MG tablet Take 1 tablet by mouth 3 (Three) Times a Day.    Calcium Carb-Cholecalciferol 1000-800 MG-UNIT tablet CALTRATE 600+D3 TABS    diclofenac (VOLTAREN) 50 MG EC tablet Take 1 tablet by mouth 2 (Two) Times a Day.    EPINEPHrine (EPIPEN) 0.3 MG/0.3ML solution auto-injector injection INJ UTD    fluticasone (FLONASE) 50 MCG/ACT nasal spray     gabapentin (NEURONTIN) 400 MG capsule TAKE 1 CAPSULE BY MOUTH THREE TIMES DAILY    levETIRAcetam (KEPPRA) 500 MG tablet Take 0.5 tablets by mouth 2 (Two) Times a Day. 1/2 TAB BID    levothyroxine (SYNTHROID, LEVOTHROID) 100 MCG tablet Take 1 tablet by mouth Daily.    montelukast (SINGULAIR) 10 MG tablet     Multiple Vitamins-Minerals (CENTRUM ADULTS PO) Take 1 tablet by mouth Daily.    omeprazole (priLOSEC) 40 MG capsule     oxybutynin (DITROPAN) 5 MG tablet TAKE 1 TABLET BY MOUTH TWICE DAILY FOR INCONTINENCY    potassium chloride (K-DUR,KLOR-CON) 20 MEQ CR tablet Take 1 tablet by mouth 3 (Three) Times a Day.    promethazine-dextromethorphan (PROMETHAZINE-DM) 6.25-15 MG/5ML syrup TAKE 5 ML BY MOUTH EVERY 6 HOURS AS NEEDED    traZODone (DESYREL) 50 MG tablet Take 1 tablet by mouth every night at bedtime.     No current  facility-administered medications on file prior to visit.         I have reviewed the patient's medical history in detail and updated the computerized patient record.  Review and summarization of old records include:    Past Medical History:   Diagnosis Date    Anxiety     Environmental and seasonal allergies     GERD (gastroesophageal reflux disease)     Migraine     Obesity     Seizures     Sleep apnea     Tobacco dependency      Past Surgical History:   Procedure Laterality Date    GALLBLADDER SURGERY      TUBAL ABDOMINAL LIGATION       Social History     Occupational History    Not on file   Tobacco Use    Smoking status: Former     Current packs/day: 0.00     Average packs/day: 2.0 packs/day for 30.0 years (60.0 ttl pk-yrs)     Types: Cigarettes     Start date: 1990     Quit date: 2020     Years since quittin.1    Smokeless tobacco: Never   Vaping Use    Vaping status: Never Used   Substance and Sexual Activity    Alcohol use: No    Drug use: Never    Sexual activity: Defer      Social History     Social History Narrative    Not on file     Family History   Problem Relation Age of Onset    Hypertension Mother     Cancer Sister     Diabetes Brother     Heart attack Nephew     Diabetes Niece                ROS  Negative unless listed in the HPI        Physical Exam  74 y.o. female  Body mass index is 43.94 kg/m²., 102 kg (225 lb)  Vitals:    25 0822   Resp: 20   SpO2: 97%     General: Alert, cooperative, appears well and in no observable distress.   HEENT: Normocephalic, atraumatic on external visual inspection. No icterus.   CV: No significant peripheral edema.   Respiratory: Normal respiratory effort.   Skin: Warm & well perfused; appropriate skin turgor.  Psych: Appropriate mood & affect.  Neuro: Gross sensation and motor intact in affected extremity/extremities.  Vascular: Peripheral pulses palpable in affected extremity/extremities.         Procedure:  Large Joint Arthrocentesis: R  knee  Date/Time: 3/11/2025 9:09 AM  Consent given by: patient  Site marked: site marked  Timeout: Immediately prior to procedure a time out was called to verify the correct patient, procedure, equipment, support staff and site/side marked as required   Supporting Documentation  Indications: pain and diagnostic evaluation   Procedure Details  Location: knee - R knee  Needle size: 25 G  Approach: anterolateral  Medications administered: 2 mL lidocaine PF 1% 1 %; 80 mg triamcinolone acetonide 40 MG/ML  Patient tolerance: patient tolerated the procedure well with no immediate complications      Large Joint Arthrocentesis: L knee  Date/Time: 3/11/2025 9:10 AM  Consent given by: patient  Site marked: site marked  Timeout: Immediately prior to procedure a time out was called to verify the correct patient, procedure, equipment, support staff and site/side marked as required   Supporting Documentation  Indications: pain and diagnostic evaluation   Procedure Details  Location: knee - L knee  Needle size: 25 G  Approach: anterolateral  Medications administered: 2 mL lidocaine PF 1% 1 %; 80 mg triamcinolone acetonide 40 MG/ML  Patient tolerance: patient tolerated the procedure well with no immediate complications            Assessment:   Diagnoses and all orders for this visit:    1. Primary osteoarthritis of both knees (Primary)      Body mass index is 43.94 kg/m².  BMI consistent with Obese Class III extreme obesity: > or equal to 40kg/m2        Plan:   -     Risks and benefits of injection therapy discussed with patient.  Possibility of bruising, pain, swelling, infection, increased blood sugar levels, cortisol flare and soft tissue atrophy discussed in detail.  Injected patient's bilateral knee joint(s)with Kenalog from an anterolateral approach   Compression/brace   Rest, ice, compression, and elevation (RICE) therapy  OTC Tylenol for pain PRN  Follow up in 3 months for repeat injections  Please call with questions or  concerns in the interim        Date of encounter: 03/11/2025   SHASTA Lopez

## 2025-03-28 ENCOUNTER — TELEPHONE (OUTPATIENT)
Dept: NEUROLOGY | Facility: CLINIC | Age: 75
End: 2025-03-28
Payer: MEDICARE

## 2025-03-28 NOTE — TELEPHONE ENCOUNTER
Stefania sent a nice letter in which she noted her chronic feeling of dizziness and was wondering if there is any of her medication she could try to reduce or stop taking.     She can stop the trazodone if taking this for sleep as it is not a required medication    If she is taking baclofen for muscle spasms she can try to go with out this medication as it is for symptom relief only    If she is taking gabapentin 400mg tid,  I can prescribe a lower dose and possibly eventually wean off this medication

## 2025-03-31 ENCOUNTER — TELEPHONE (OUTPATIENT)
Dept: NEUROLOGY | Facility: CLINIC | Age: 75
End: 2025-03-31
Payer: MEDICARE

## 2025-03-31 NOTE — TELEPHONE ENCOUNTER
Provider: DR SEIPEL    Caller: Stefania Marie    Relationship to Patient: Self    Phone Number: [phone]    Reason for Call: STATES HER CPAP PRESSURE IS TOO HIGH, IT IS DRYING OUT HER NOSE. WOULD LIKE TO HAVE IT LOWERED. PLEASE REVIEW, THANK YOU.

## 2025-03-31 NOTE — TELEPHONE ENCOUNTER
Spoke to the patient, she will stop trazadone and baclofen.  She is only taking 1 gabapentin at night,

## 2025-04-02 ENCOUNTER — TELEPHONE (OUTPATIENT)
Dept: NEUROLOGY | Facility: CLINIC | Age: 75
End: 2025-04-02
Payer: MEDICARE

## 2025-04-02 NOTE — TELEPHONE ENCOUNTER
Caller: Stefania Marie    Relationship: Self    Best call back number: [phone] (home)      What is the best time to reach you:     Who are you requesting to speak with (clinical staff, provider,  specific staff member):   DR SEIPEL    Do you know the name of the person who called:     What was the call regarding:   CPAP USAGE  PT IS ABLE TO USE THE CPAP AS REQUIRED DUE TO SINUS AND ALLERGY ISSUES BECAUSE IT DRIES OUT HER NOSE. SHE IS USING IT AS MUCH AS SHE CAN.

## 2025-04-07 DIAGNOSIS — M17.0 PRIMARY OSTEOARTHRITIS OF BOTH KNEES: Chronic | ICD-10-CM

## 2025-04-07 DIAGNOSIS — F41.9 ANXIETY: Chronic | ICD-10-CM

## 2025-04-07 RX ORDER — BUSPIRONE HYDROCHLORIDE 10 MG/1
10 TABLET ORAL 3 TIMES DAILY
Qty: 270 TABLET | Refills: 1 | Status: SHIPPED | OUTPATIENT
Start: 2025-04-07

## 2025-06-17 ENCOUNTER — OFFICE VISIT (OUTPATIENT)
Dept: ORTHOPEDIC SURGERY | Facility: CLINIC | Age: 75
End: 2025-06-17
Payer: MEDICARE

## 2025-06-17 VITALS — OXYGEN SATURATION: 95 % | BODY MASS INDEX: 44.17 KG/M2 | WEIGHT: 225 LBS | HEIGHT: 60 IN | RESPIRATION RATE: 20 BRPM

## 2025-06-17 DIAGNOSIS — M17.0 PRIMARY OSTEOARTHRITIS OF BOTH KNEES: Primary | ICD-10-CM

## 2025-06-17 RX ORDER — TRIAMCINOLONE ACETONIDE 40 MG/ML
80 INJECTION, SUSPENSION INTRA-ARTICULAR; INTRAMUSCULAR
Status: COMPLETED | OUTPATIENT
Start: 2025-06-17 | End: 2025-06-17

## 2025-06-17 RX ORDER — LIDOCAINE HYDROCHLORIDE 10 MG/ML
2 INJECTION, SOLUTION EPIDURAL; INFILTRATION; INTRACAUDAL; PERINEURAL
Status: COMPLETED | OUTPATIENT
Start: 2025-06-17 | End: 2025-06-17

## 2025-06-17 RX ADMIN — TRIAMCINOLONE ACETONIDE 80 MG: 40 INJECTION, SUSPENSION INTRA-ARTICULAR; INTRAMUSCULAR at 09:31

## 2025-06-17 RX ADMIN — TRIAMCINOLONE ACETONIDE 80 MG: 40 INJECTION, SUSPENSION INTRA-ARTICULAR; INTRAMUSCULAR at 09:32

## 2025-06-17 RX ADMIN — LIDOCAINE HYDROCHLORIDE 2 ML: 10 INJECTION, SOLUTION EPIDURAL; INFILTRATION; INTRACAUDAL; PERINEURAL at 09:31

## 2025-06-17 RX ADMIN — LIDOCAINE HYDROCHLORIDE 2 ML: 10 INJECTION, SOLUTION EPIDURAL; INFILTRATION; INTRACAUDAL; PERINEURAL at 09:32

## 2025-06-17 NOTE — PROGRESS NOTES
INJECTION VISIT    Patient: Stefania Marie    YOB: 1950    MRN: [id number]    Chief Complaint   Patient presents with    Left Knee - Follow-up    Right Knee - Follow-up       History of Present Illness:   Stefania Marie is a 74 y.o. year old who presents today for injection of bilateral knees. Her last injections were 3 months ago and provided good relief of symptoms.     She does note instability in her bilateral knees with chronic buckling and giving way. She feels the instability may cause a fall.         Allergies:   Allergies   Allergen Reactions    Penicillins Itching       Medications:   Home Medications:  Current Outpatient Medications on File Prior to Visit   Medication Sig    amLODIPine (NORVASC) 5 MG tablet Take 1 tablet by mouth Daily.    aspirin (ADULT ASPIRIN EC LOW STRENGTH) 81 MG EC tablet     baclofen (LIORESAL) 10 MG tablet TK 1 T PO TID PRN    busPIRone (BUSPAR) 10 MG tablet TAKE 1 TABLET BY MOUTH THREE TIMES DAILY    Calcium Carb-Cholecalciferol 1000-800 MG-UNIT tablet CALTRATE 600+D3 TABS    diclofenac (VOLTAREN) 50 MG EC tablet TAKE 1 TABLET BY MOUTH TWICE DAILY    EPINEPHrine (EPIPEN) 0.3 MG/0.3ML solution auto-injector injection INJ UTD    fluticasone (FLONASE) 50 MCG/ACT nasal spray     gabapentin (NEURONTIN) 400 MG capsule TAKE 1 CAPSULE BY MOUTH THREE TIMES DAILY    levETIRAcetam (KEPPRA) 500 MG tablet Take 0.5 tablets by mouth 2 (Two) Times a Day. 1/2 TAB BID    levothyroxine (SYNTHROID, LEVOTHROID) 100 MCG tablet Take 1 tablet by mouth Daily.    montelukast (SINGULAIR) 10 MG tablet     Multiple Vitamins-Minerals (CENTRUM ADULTS PO) Take 1 tablet by mouth Daily.    omeprazole (priLOSEC) 40 MG capsule     oxybutynin (DITROPAN) 5 MG tablet TAKE 1 TABLET BY MOUTH TWICE DAILY FOR INCONTINENCY    potassium chloride (K-DUR,KLOR-CON) 20 MEQ CR tablet Take 1 tablet by mouth 3 (Three) Times a Day.    promethazine-dextromethorphan (PROMETHAZINE-DM) 6.25-15 MG/5ML syrup TAKE 5 ML BY  MOUTH EVERY 6 HOURS AS NEEDED    traZODone (DESYREL) 50 MG tablet Take 1 tablet by mouth every night at bedtime.     No current facility-administered medications on file prior to visit.         I have reviewed the patient's medical history in detail and updated the computerized patient record.  Review and summarization of old records include:    Past Medical History:   Diagnosis Date    Anxiety     Environmental and seasonal allergies     GERD (gastroesophageal reflux disease)     Migraine     Obesity     Seizures     Sleep apnea     Tobacco dependency      Past Surgical History:   Procedure Laterality Date    GALLBLADDER SURGERY      TUBAL ABDOMINAL LIGATION       Social History     Occupational History    Not on file   Tobacco Use    Smoking status: Former     Current packs/day: 0.00     Average packs/day: 2.0 packs/day for 30.0 years (60.0 ttl pk-yrs)     Types: Cigarettes     Start date: 1990     Quit date: 2020     Years since quittin.4    Smokeless tobacco: Never   Vaping Use    Vaping status: Never Used   Substance and Sexual Activity    Alcohol use: No    Drug use: Never    Sexual activity: Defer      Social History     Social History Narrative    Not on file     Family History   Problem Relation Age of Onset    Hypertension Mother     Cancer Sister     Diabetes Brother     Heart attack Nephew     Diabetes Niece                ROS  Negative unless listed in the HPI        Physical Exam  74 y.o. female  Body mass index is 43.94 kg/m²., 102 kg (225 lb)  Vitals:    25 0904   Resp: 20   SpO2: 95%     General: Alert, cooperative, appears well and in no observable distress.   HEENT: Normocephalic, atraumatic on external visual inspection. No icterus.   CV: No significant peripheral edema.   Respiratory: Normal respiratory effort.   Skin: Warm & well perfused; appropriate skin turgor.  Psych: Appropriate mood & affect.  Neuro: Gross sensation and motor intact in affected  extremity/extremities.  Vascular: Peripheral pulses palpable in affected extremity/extremities.         Procedure:  Large Joint Arthrocentesis: R knee  Date/Time: 6/17/2025 9:31 AM  Consent given by: patient  Site marked: site marked  Timeout: Immediately prior to procedure a time out was called to verify the correct patient, procedure, equipment, support staff and site/side marked as required   Supporting Documentation  Indications: pain and diagnostic evaluation   Procedure Details  Location: knee - R knee  Preparation: Patient was prepped and draped in the usual sterile fashion  Needle size: 25 G  Approach: anterolateral  Medications administered: 2 mL lidocaine PF 1% 1 %; 80 mg triamcinolone acetonide 40 MG/ML  Patient tolerance: patient tolerated the procedure well with no immediate complications      Large Joint Arthrocentesis: L knee  Date/Time: 6/17/2025 9:32 AM  Consent given by: patient  Site marked: site marked  Timeout: Immediately prior to procedure a time out was called to verify the correct patient, procedure, equipment, support staff and site/side marked as required   Supporting Documentation  Indications: pain and diagnostic evaluation   Procedure Details  Location: knee - L knee  Preparation: Patient was prepped and draped in the usual sterile fashion  Needle size: 25 G  Approach: anterolateral  Medications administered: 2 mL lidocaine PF 1% 1 %; 80 mg triamcinolone acetonide 40 MG/ML  Patient tolerance: patient tolerated the procedure well with no immediate complications            Assessment:   Diagnoses and all orders for this visit:    1. Primary osteoarthritis of both knees (Primary)    Other orders  -     Large Joint Arthrocentesis  -     Large Joint Arthrocentesis      Body mass index is 43.94 kg/m².  BMI consistent with Obese Class III extreme obesity: > or equal to 40kg/m2        Plan:   -     Risks and benefits of injection therapy discussed with patient.  Possibility of bruising, pain,  swelling, infection, increased blood sugar levels, cortisol flare and soft tissue atrophy discussed in detail.  Injected patient's bilateral knee joint(s)with Kenalog from an anterolateral approach   Bilateral drytex braces for chronic instability related to advanced OA  Rest, ice, compression, and elevation (RICE) therapy  OTC Tylenol for pain PRN  Follow up in 3 months for repeat injections   Please call with questions or concerns in the interim        Date of encounter: 06/17/2025   SHASTA Lopez

## 2025-06-23 RX ORDER — GABAPENTIN 400 MG/1
400 CAPSULE ORAL 3 TIMES DAILY
Qty: 270 CAPSULE | Refills: 3 | Status: SHIPPED | OUTPATIENT
Start: 2025-06-23

## 2025-06-23 NOTE — TELEPHONE ENCOUNTER
Caller: Ender Mariee    Relationship: Self    Best call back number: [phone]     Requested Prescriptions:   Requested Prescriptions     Pending Prescriptions Disp Refills    gabapentin (NEURONTIN) 400 MG capsule 270 capsule 0     Sig: Take 1 capsule by mouth 3 (Three) Times a Day.        [id number] [place] - [address] AT [address] OF [address] [facility] [phone] [facility] [phone] FX     Last office visit with prescribing clinician: 10/22/2024   Last telemedicine visit with prescribing clinician: Visit date not found   Next office visit with prescribing clinician: 10/23/2025     Additional details provided by patient: PT HAS ABOUT WEEKS WORTH LEFT OF THE RX    Does the patient have less than a 3 day supply:  [] Yes  [x] No    Would you like a call back once the refill request has been completed: [] Yes [x] No    If the office needs to give you a call back, can they leave a voicemail: [] Yes [x] No    Awais Gerard Rep   06/23/25 16:04 EDT

## 2025-08-21 ENCOUNTER — TELEPHONE (OUTPATIENT)
Dept: ORTHOPEDIC SURGERY | Facility: CLINIC | Age: 75
End: 2025-08-21
Payer: MEDICARE